# Patient Record
Sex: MALE | Race: WHITE | NOT HISPANIC OR LATINO | Employment: FULL TIME | URBAN - METROPOLITAN AREA
[De-identification: names, ages, dates, MRNs, and addresses within clinical notes are randomized per-mention and may not be internally consistent; named-entity substitution may affect disease eponyms.]

---

## 2018-03-01 ENCOUNTER — OFFICE VISIT (OUTPATIENT)
Dept: FAMILY MEDICINE | Facility: CLINIC | Age: 28
End: 2018-03-01
Payer: COMMERCIAL

## 2018-03-01 ENCOUNTER — TELEPHONE (OUTPATIENT)
Dept: FAMILY MEDICINE | Facility: CLINIC | Age: 28
End: 2018-03-01

## 2018-03-01 VITALS
SYSTOLIC BLOOD PRESSURE: 104 MMHG | TEMPERATURE: 98 F | DIASTOLIC BLOOD PRESSURE: 74 MMHG | WEIGHT: 173.38 LBS | HEART RATE: 84 BPM | BODY MASS INDEX: 24.27 KG/M2 | HEIGHT: 71 IN

## 2018-03-01 DIAGNOSIS — R07.89 CHEST WALL PAIN: Primary | ICD-10-CM

## 2018-03-01 PROCEDURE — 99204 OFFICE O/P NEW MOD 45 MIN: CPT | Mod: S$GLB,,, | Performed by: NURSE PRACTITIONER

## 2018-03-01 PROCEDURE — 99999 PR PBB SHADOW E&M-NEW PATIENT-LVL III: CPT | Mod: PBBFAC,,, | Performed by: NURSE PRACTITIONER

## 2018-03-01 NOTE — PROGRESS NOTES
Subjective:       Patient ID: Freddy Villegas is a 27 y.o. male.    Chief Complaint: Chest Pain (pt is c/o pain in his ribs-left side x3 days when he breathes in deeply. )    HPI     Pt presents to clinic with complaints of sharp pains to his left ribcage upon deep inspiration over the past 3 weeks.   He notes an uncomfortable sensation constantly, but the sharp pain is intermittent.   He first noted the sx to the lower chest wall, but yesterday sx seemed to be near the left breast.   Denies pain with activity, SOB. He is not a smoker. Family Hx - paternal grandfather MI (unsure of age), paternal uncle MI (50s).   He has taken ASA for sx with some relief. Denies any injuries to the area.     Review of Systems   Constitutional: Negative for chills and fever.   HENT: Negative for rhinorrhea, sinus pain, sinus pressure and sneezing.    Eyes: Negative for discharge and itching.   Respiratory: Negative for cough, shortness of breath and wheezing.    Cardiovascular: Negative for chest pain, palpitations and leg swelling.   Gastrointestinal: Negative for blood in stool, diarrhea, nausea and vomiting.   Genitourinary: Negative for difficulty urinating, dysuria, frequency, hematuria and urgency.   Skin: Negative for rash and wound.   Allergic/Immunologic: Negative for environmental allergies and food allergies.   Neurological: Negative for dizziness, light-headedness and numbness.   Psychiatric/Behavioral: Negative for dysphoric mood and sleep disturbance. The patient is not nervous/anxious.        Objective:      Physical Exam   Constitutional: He is oriented to person, place, and time. He appears well-developed and well-nourished.   HENT:   Head: Normocephalic and atraumatic.   Cardiovascular: Normal rate, regular rhythm and normal heart sounds.    No murmur heard.  Pulmonary/Chest: Effort normal and breath sounds normal. No respiratory distress. He has no wheezes. He has no rales. He exhibits tenderness. He exhibits no  crepitus, no edema and no swelling.   Musculoskeletal: Normal range of motion. He exhibits no edema, tenderness or deformity.   Neurological: He is alert and oriented to person, place, and time. No cranial nerve deficit.   Skin: Skin is warm and dry.   Psychiatric: He has a normal mood and affect. His behavior is normal.   Nursing note and vitals reviewed.      Assessment:       1. Chest wall pain        Plan:   Freddy was seen today for chest pain.    Diagnoses and all orders for this visit:    Chest wall pain  -     EKG 12-lead; Future  -     X-Ray Chest PA And Lateral; Future  Sx likely muscular in nature.   RICE therapy, Nsaids.   RTC if sx worsen or fail to improve.

## 2018-03-01 NOTE — TELEPHONE ENCOUNTER
----- Message from Nathalie Farley sent at 3/1/2018  3:59 PM CST -----  Patient states that office was supposed to call in some medication for him today but it never got there. Please remit to:      [facility] [id number] [place] [address] [address] [place] [address] AT [facility]  [address] [place] [id number]  Phone: [phone] Fax: [phone]    Please call when completed at [phone].

## 2018-03-02 RX ORDER — NAPROXEN SODIUM 550 MG/1
550 TABLET ORAL 2 TIMES DAILY WITH MEALS
Qty: 20 TABLET | Refills: 0 | Status: SHIPPED | OUTPATIENT
Start: 2018-03-02 | End: 2018-03-12

## 2018-03-02 NOTE — TELEPHONE ENCOUNTER
Attempted to contact pt to inform him that Naproxen was sent to pharmacy.     No answer; left message that script has been called in.

## 2018-03-06 ENCOUNTER — HOSPITAL ENCOUNTER (OUTPATIENT)
Dept: RADIOLOGY | Facility: HOSPITAL | Age: 28
Discharge: HOME | End: 2018-03-06
Attending: NURSE PRACTITIONER
Payer: COMMERCIAL

## 2018-03-06 ENCOUNTER — CLINICAL SUPPORT (OUTPATIENT)
Dept: CARDIOLOGY | Facility: CLINIC | Age: 28
End: 2018-03-06
Payer: COMMERCIAL

## 2018-03-06 DIAGNOSIS — R07.89 CHEST WALL PAIN: ICD-10-CM

## 2018-03-06 PROCEDURE — 71046 X-RAY EXAM CHEST 2 VIEWS: CPT | Mod: TC,FY,PO

## 2018-03-06 PROCEDURE — 93000 ELECTROCARDIOGRAM COMPLETE: CPT | Mod: S$GLB,,, | Performed by: INTERNAL MEDICINE

## 2018-03-06 PROCEDURE — 71046 X-RAY EXAM CHEST 2 VIEWS: CPT | Mod: 26,,, | Performed by: RADIOLOGY

## 2020-07-10 ENCOUNTER — LAB VISIT (OUTPATIENT)
Dept: PRIMARY CARE CLINIC | Facility: OTHER | Age: 30
End: 2020-07-10
Attending: INTERNAL MEDICINE
Payer: OTHER GOVERNMENT

## 2020-07-10 DIAGNOSIS — Z03.818 ENCOUNTER FOR OBSERVATION FOR SUSPECTED EXPOSURE TO OTHER BIOLOGICAL AGENTS RULED OUT: ICD-10-CM

## 2020-07-10 PROCEDURE — U0003 INFECTIOUS AGENT DETECTION BY NUCLEIC ACID (DNA OR RNA); SEVERE ACUTE RESPIRATORY SYNDROME CORONAVIRUS 2 (SARS-COV-2) (CORONAVIRUS DISEASE [COVID-19]), AMPLIFIED PROBE TECHNIQUE, MAKING USE OF HIGH THROUGHPUT TECHNOLOGIES AS DESCRIBED BY CMS-2020-01-R: HCPCS

## 2020-07-11 LAB — SARS-COV-2 RNA RESP QL NAA+PROBE: NOT DETECTED

## 2021-03-10 ENCOUNTER — IMMUNIZATION (OUTPATIENT)
Dept: FAMILY MEDICINE | Facility: CLINIC | Age: 31
End: 2021-03-10

## 2021-03-10 DIAGNOSIS — Z23 NEED FOR VACCINATION: Primary | ICD-10-CM

## 2021-03-10 PROCEDURE — 91300 COVID-19, MRNA, LNP-S, PF, 30 MCG/0.3 ML DOSE VACCINE: ICD-10-PCS | Mod: ,,, | Performed by: FAMILY MEDICINE

## 2021-03-10 PROCEDURE — 0001A COVID-19, MRNA, LNP-S, PF, 30 MCG/0.3 ML DOSE VACCINE: ICD-10-PCS | Mod: CV19,,, | Performed by: FAMILY MEDICINE

## 2021-03-10 PROCEDURE — 91300 COVID-19, MRNA, LNP-S, PF, 30 MCG/0.3 ML DOSE VACCINE: CPT | Mod: ,,, | Performed by: FAMILY MEDICINE

## 2021-03-10 PROCEDURE — 0001A COVID-19, MRNA, LNP-S, PF, 30 MCG/0.3 ML DOSE VACCINE: CPT | Mod: CV19,,, | Performed by: FAMILY MEDICINE

## 2021-03-31 ENCOUNTER — IMMUNIZATION (OUTPATIENT)
Dept: FAMILY MEDICINE | Facility: CLINIC | Age: 31
End: 2021-03-31

## 2021-03-31 DIAGNOSIS — Z23 NEED FOR VACCINATION: Primary | ICD-10-CM

## 2021-03-31 PROCEDURE — 91300 COVID-19, MRNA, LNP-S, PF, 30 MCG/0.3 ML DOSE VACCINE: ICD-10-PCS | Mod: ,,, | Performed by: INTERNAL MEDICINE

## 2021-03-31 PROCEDURE — 91300 COVID-19, MRNA, LNP-S, PF, 30 MCG/0.3 ML DOSE VACCINE: CPT | Mod: ,,, | Performed by: INTERNAL MEDICINE

## 2021-03-31 PROCEDURE — 0002A COVID-19, MRNA, LNP-S, PF, 30 MCG/0.3 ML DOSE VACCINE: ICD-10-PCS | Mod: CV19,,, | Performed by: INTERNAL MEDICINE

## 2021-03-31 PROCEDURE — 0002A COVID-19, MRNA, LNP-S, PF, 30 MCG/0.3 ML DOSE VACCINE: CPT | Mod: CV19,,, | Performed by: INTERNAL MEDICINE

## 2022-01-08 ENCOUNTER — IMMUNIZATION (OUTPATIENT)
Dept: FAMILY MEDICINE | Facility: CLINIC | Age: 32
End: 2022-01-08
Payer: OTHER GOVERNMENT

## 2022-01-08 DIAGNOSIS — Z23 NEED FOR VACCINATION: Primary | ICD-10-CM

## 2022-01-08 PROCEDURE — 0004A COVID-19, MRNA, LNP-S, PF, 30 MCG/0.3 ML DOSE VACCINE: CPT | Mod: PBBFAC,PO

## 2023-03-18 ENCOUNTER — OFFICE VISIT (OUTPATIENT)
Dept: URGENT CARE | Facility: CLINIC | Age: 33
End: 2023-03-18
Payer: COMMERCIAL

## 2023-03-18 VITALS
OXYGEN SATURATION: 98 % | HEIGHT: 71 IN | DIASTOLIC BLOOD PRESSURE: 70 MMHG | SYSTOLIC BLOOD PRESSURE: 124 MMHG | HEART RATE: 64 BPM | WEIGHT: 173 LBS | TEMPERATURE: 97 F | BODY MASS INDEX: 24.22 KG/M2 | RESPIRATION RATE: 20 BRPM

## 2023-03-18 DIAGNOSIS — N50.819 PAIN IN TESTICLE, UNSPECIFIED LATERALITY: Primary | ICD-10-CM

## 2023-03-18 DIAGNOSIS — N50.819 TESTICLE TENDERNESS: ICD-10-CM

## 2023-03-18 LAB
BILIRUB UR QL STRIP: NEGATIVE
GLUCOSE UR QL STRIP: NEGATIVE
KETONES UR QL STRIP: NEGATIVE
LEUKOCYTE ESTERASE UR QL STRIP: NEGATIVE
PH, POC UA: 6 (ref 5–8)
POC BLOOD, URINE: NEGATIVE
POC NITRATES, URINE: NEGATIVE
PROT UR QL STRIP: NEGATIVE
SP GR UR STRIP: 1.02 (ref 1–1.03)
UROBILINOGEN UR STRIP-ACNC: NORMAL (ref 0.3–2.2)

## 2023-03-18 PROCEDURE — 81003 POCT URINALYSIS, DIPSTICK, AUTOMATED, W/O SCOPE: ICD-10-PCS | Mod: QW,S$GLB,, | Performed by: NURSE PRACTITIONER

## 2023-03-18 PROCEDURE — 81003 URINALYSIS AUTO W/O SCOPE: CPT | Mod: QW,S$GLB,, | Performed by: NURSE PRACTITIONER

## 2023-03-18 PROCEDURE — 99213 PR OFFICE/OUTPT VISIT, EST, LEVL III, 20-29 MIN: ICD-10-PCS | Mod: S$GLB,,, | Performed by: NURSE PRACTITIONER

## 2023-03-18 PROCEDURE — 99213 OFFICE O/P EST LOW 20 MIN: CPT | Mod: S$GLB,,, | Performed by: NURSE PRACTITIONER

## 2023-03-18 NOTE — PROGRESS NOTES
Subjective:       Patient ID: Freddy Villegas is a 32 y.o. male.    Chief Complaint: No chief complaint on file.    HPI  ROS     Objective:      Physical Exam    Assessment:       No diagnosis found.    Plan:                   No follow-ups on file.

## 2023-03-18 NOTE — PATIENT INSTRUCTIONS
Take ibuprofren 600-800 mg three times a day with food for at least 3-5 days.     ER precautions reviewed, US would be best for full evaluation

## 2023-03-18 NOTE — PROGRESS NOTES
Subjective:       Patient ID: Freddy Villegas is a 32 y.o. male.    Chief Complaint: Testicle Pain    Pt states he has been having left testicle pain sx started 3 days ago , a few weeks prior while at the gym 3 weeks ago he was lifting weights and felt his lower back strain, then 3 days later the pain in his testicle started . States pain is sore and strained. Occ sharp pain , denies penile discharge,  no known previous hx, denies any urinary sx . Denies any scrotal edema ,testicle pain is 4/10.       Testicle Pain  The patient's primary symptoms include testicular pain. This is a new problem. The current episode started in the past 7 days. The testicular pain affects the left testicle.     Genitourinary:  Positive for testicular pain.      Objective:      Physical Exam  Vitals and nursing note reviewed. Exam conducted with a chaperone present.   HENT:      Head: Normocephalic.   Eyes:      Pupils: Pupils are equal, round, and reactive to light.   Cardiovascular:      Rate and Rhythm: Normal rate.   Pulmonary:      Effort: Pulmonary effort is normal.   Abdominal:      Hernia: There is no hernia in the left inguinal area or right inguinal area.   Genitourinary:     Penis: Circumcised.       Testes:         Right: Tenderness or swelling not present. Right testis is descended. Cremasteric reflex is present.          Left: Tenderness present. Swelling not present. Left testis is descended. Cremasteric reflex is present.       Epididymis:      Right: No tenderness.      Left: Tenderness present.      Comments: Peyronie's noted,   Left testicle with no significant swelling or bruising, mild ttp   Skin:     General: Skin is warm and dry.   Neurological:      Mental Status: He is alert.   Psychiatric:         Mood and Affect: Mood normal.         Behavior: Behavior normal.         Thought Content: Thought content normal.         Judgment: Judgment normal.       Assessment:       1. Pain in testicle, unspecified laterality     2. Testicle tenderness        Plan:                Patient Instructions   Take ibuprofren 600-800 mg three times a day with food for at least 3-5 days.     ER precautions reviewed, US would be best for full evaluation          No follow-ups on file.      Results for orders placed or performed in visit on 03/18/23   POCT Urinalysis, Dipstick, Automated, W/O Scope   Result Value Ref Range    POC Blood, Urine Negative Negative    POC Bilirubin, Urine Negative Negative    POC Urobilinogen, Urine neg 0.3 - 2.2    POC Ketones, Urine Negative Negative    POC Protein, Urine Negative Negative    POC Nitrates, Urine Negative Negative    POC Glucose, Urine Negative Negative    pH, UA 6.0 5 - 8    POC Specific Gravity, Urine 1.020 1.003 - 1.029    POC Leukocytes, Urine Negative Negative

## 2024-08-02 DIAGNOSIS — M25.561 RIGHT KNEE PAIN, UNSPECIFIED CHRONICITY: Primary | ICD-10-CM

## 2024-08-05 ENCOUNTER — HOSPITAL ENCOUNTER (OUTPATIENT)
Dept: RADIOLOGY | Facility: HOSPITAL | Age: 34
Discharge: HOME | End: 2024-08-05
Attending: ORTHOPAEDIC SURGERY
Payer: COMMERCIAL

## 2024-08-05 ENCOUNTER — OFFICE VISIT (OUTPATIENT)
Dept: ORTHOPEDICS | Facility: CLINIC | Age: 34
End: 2024-08-05
Payer: COMMERCIAL

## 2024-08-05 VITALS — WEIGHT: 173.06 LBS | BODY MASS INDEX: 24.23 KG/M2 | HEIGHT: 71 IN

## 2024-08-05 DIAGNOSIS — M25.562 ACUTE PAIN OF LEFT KNEE: ICD-10-CM

## 2024-08-05 DIAGNOSIS — M25.562 LEFT KNEE PAIN: Primary | ICD-10-CM

## 2024-08-05 DIAGNOSIS — S83.92XA LEFT KNEE SPRAIN: ICD-10-CM

## 2024-08-05 DIAGNOSIS — M25.562 LEFT KNEE PAIN, UNSPECIFIED CHRONICITY: ICD-10-CM

## 2024-08-05 DIAGNOSIS — M25.561 RIGHT KNEE PAIN, UNSPECIFIED CHRONICITY: ICD-10-CM

## 2024-08-05 DIAGNOSIS — S83.512A SPRAIN OF ANTERIOR CRUCIATE LIGAMENT OF LEFT KNEE, INITIAL ENCOUNTER: ICD-10-CM

## 2024-08-05 DIAGNOSIS — S83.92XA LEFT KNEE SPRAIN: Primary | ICD-10-CM

## 2024-08-05 PROCEDURE — 1159F MED LIST DOCD IN RCRD: CPT | Mod: CPTII,S$GLB,, | Performed by: ORTHOPAEDIC SURGERY

## 2024-08-05 PROCEDURE — 97760 ORTHOTIC MGMT&TRAING 1ST ENC: CPT | Mod: S$GLB,,, | Performed by: ORTHOPAEDIC SURGERY

## 2024-08-05 PROCEDURE — 73562 X-RAY EXAM OF KNEE 3: CPT | Mod: 26,LT,, | Performed by: RADIOLOGY

## 2024-08-05 PROCEDURE — 99204 OFFICE O/P NEW MOD 45 MIN: CPT | Mod: 25,S$GLB,, | Performed by: ORTHOPAEDIC SURGERY

## 2024-08-05 PROCEDURE — 3008F BODY MASS INDEX DOCD: CPT | Mod: CPTII,S$GLB,, | Performed by: ORTHOPAEDIC SURGERY

## 2024-08-05 PROCEDURE — 99999 PR PBB SHADOW E&M-EST. PATIENT-LVL II: CPT | Mod: PBBFAC,,, | Performed by: ORTHOPAEDIC SURGERY

## 2024-08-05 PROCEDURE — 73560 X-RAY EXAM OF KNEE 1 OR 2: CPT | Mod: 26,59,RT, | Performed by: RADIOLOGY

## 2024-08-05 PROCEDURE — 73560 X-RAY EXAM OF KNEE 1 OR 2: CPT | Mod: TC,PO,RT

## 2024-08-12 ENCOUNTER — OFFICE VISIT (OUTPATIENT)
Dept: ORTHOPEDICS | Facility: CLINIC | Age: 34
End: 2024-08-12
Payer: COMMERCIAL

## 2024-08-12 VITALS — WEIGHT: 185 LBS | HEIGHT: 71 IN | BODY MASS INDEX: 25.9 KG/M2

## 2024-08-12 DIAGNOSIS — M25.562 LEFT KNEE PAIN, UNSPECIFIED CHRONICITY: Primary | ICD-10-CM

## 2024-08-12 DIAGNOSIS — S83.512A SPRAIN OF ANTERIOR CRUCIATE LIGAMENT OF LEFT KNEE, INITIAL ENCOUNTER: ICD-10-CM

## 2024-08-12 PROCEDURE — 99214 OFFICE O/P EST MOD 30 MIN: CPT | Mod: S$GLB,,, | Performed by: ORTHOPAEDIC SURGERY

## 2024-08-12 PROCEDURE — 99999 PR PBB SHADOW E&M-EST. PATIENT-LVL II: CPT | Mod: PBBFAC,,, | Performed by: ORTHOPAEDIC SURGERY

## 2024-08-12 PROCEDURE — 3008F BODY MASS INDEX DOCD: CPT | Mod: CPTII,S$GLB,, | Performed by: ORTHOPAEDIC SURGERY

## 2024-08-12 NOTE — PROGRESS NOTES
33 years old follow-up of his left knee sprain presumed ACL tear.  MRI confirmed our suspicion of ACL tear    Assessment: Left knee ACL tear     Plan:  We had discussion about operative versus nonoperative care.  Patient interested in nonoperative care at this point.  We will get him a new knee brace, we will get him set up with therapy, we will check him back in about a month, and if at any time he becomes interested in ACL reconstruction will be happy to offer him that leaning towards an allograft

## 2024-08-21 NOTE — PROGRESS NOTES
[facility]   Physical Therapy Initial Evaluation      Name: Freddy Villegas  [facility] Number: [id number]    Therapy Diagnosis:   Encounter Diagnoses   Name Primary?    Sprain of anterior cruciate ligament of left knee, initial encounter     Difficulty walking Yes     Physician: Gaetano Polk MD    Physician Orders: PT Eval and Treat   Medical Diagnosis from Referral: S83.512A (ICD-10-CM) - Sprain of anterior cruciate ligament of left knee  Evaluation Date: 8/27/2024  Authorization Period Expiration: 8/12/2025  Plan of Care Expiration: 10/27/2024  Visit # / Visits authorized: 1/ 1  #Visits based on PHYSICAL THERAPY POC: 12     Foto  Date  Score      Knee   Lower Score = Greater Disability   #1/3 8/27/2024 70.7   #2/3     #3/3       Time in  1:13pm   Time out 1:55pm   Total Appointment Time (timed & untimed codes) 42 minutes      Precautions: Standard    Subjective     Date of injury: about 3 weeks ago  History of current condition - patient was playing basketball, landed on the ground and felt his L knee buckle resulting in falling to the ground. He then found out her tore his L ACL.      Prior medical treatment: L knee brace provided for patient by Dr. Polk     History of falls/MVAs: none     Current functional status  Severe limitation    Previous functional status  No limitation    Patient stated goal Return to previous level of function      Imaging:  L X-ray FINDINGS: No acute fracture or dislocation.  Slight bilateral lateral patellar tilt and patellofemoral joint space narrowing.  Small left joint effusion.    L knee MRI Impression:  1. There appears to be evidence of intermediate grade ACL strain.  2. Subtle intermediate signal in the proximal PCL suggestive of low-grade strain is seen.  3. There is intermediate increased T2 signal in the anterior horn root ligament of the lateral meniscus suggestive of low to intermediate grade strain.  Otherwise, the lateral meniscus  "appears intact.  4. Minimal edema like signal along the posterior distal fibers of the vastus medialis and vastus lateralis muscles may reflect low-grade strains.  5. There is attenuation of the MPFL with subtle increased adjacent T2 signal along its deep fibers near the medial epicondyle insertion which may reflect minimal partial thickness deep fiber tearing.  6. Medial head gastrocnemius tendinopathy is seen with adjacent ganglia.  7. Baker's cyst.  8. Additional findings and details as above.    Patient discussed MRI with Dr. Polk, who indicated a Gr III ACL tear at L knee.     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      Pain:      Current 0/10       Worst 10/10       Best  0/10     Location L medial knee    Description  Sudden sharp and shooting pain     Aggravating factors Pivoting wrong    Easing factors  Brace      Medical History:   No past medical history on file.    Surgical History:   Freddy Villegas  has a past surgical history that includes Clavicle surgery.    Medications:   Freddy currently has no medications in their medication list.    Allergies:   Review of patient's allergies indicates:  No Known Allergies     Objective    8/27/2024:  Observation/posture: sits with L LE extended, L gastroc atrophy     Gait: WNL    Range of Motion:   Knee Right  Left    Flexion 145 142   Extension +2 0     Lower Extremity Strength       Knee           Right          Left     Extension: 5/5 4-/5   Flexion: 4+/5 4+/5   Hip  Right  Left    Abduction  4+/5 4+/5     Function/balance:    Right  Left    Single leg stance  - 30", 30", 30" 20", 13", 30" L>R pes planus in SLS    Squat tba  - -     Joint Mobility:    Left    PFJ  Impaired    Knee joint  Impaired      Edema:    Joint line in cm 10 cm distal to tibial tuberosity in cm 5 cm above in cm   Right 38.0 36.5 40.0   Left  37.5 36.0 38.5      Treatment   Total Treatment time (time-based codes) separate " "from Evaluation: 28 minutes    + indicates new exercise   Bold indicates completed exercise    therapeutic exercises to develop strength, endurance, ROM, flexibility, posture, and core stabilization for 18 minutes:  Patient education on nature of current condition and PHYSICAL THERAPY POC  Written HOME EXERCISE PROGRAM provided, reviewed, patient demo understanding   L SLR, 3x10   L ankle DF/PF, blue band, 3x10     manual therapy techniques: Joint mobilizations, Manual traction, Myofacial release, Soft tissue Mobilization, and Friction Massage for 4 minutes:  L PFJ, knee joint Gr III/II JM     neuromuscular re-education activities to improve: Balance, Coordination, Kinesthetic, Sense, Proprioception, and Posture for 6 minutes:  L quad sets, 5" hold, 3x10    therapeutic activities to improve functional performance for 00 minutes:      gait training to improve functional mobility and safety for 00 minutes:      Home Exercises and Patient Education Provided  Education provided:   - yes    Home Exercises Provided: yes.  Exercises were reviewed and Freddy was able to demonstrate them prior to the end of the session.  Freddy demonstrated good  understanding of the education provided.     Assessment     Freddy is a 33 y.o. referred to outpatient Physical Therapy with a medical diagnosis of S83.512A (ICD-10-CM) - Sprain of anterior cruciate ligament of left knee.     Pt presents with decreased ROM, muscle strength, flexibility, joint mobility. Increased pain, stiffness, soft tissue restriction. Impaired posture, joint mechanics, balance, gait pattern.     The patient's current task deficits include the following: limitation in ADL, self care, social/recreational tasks.     Patient prognosis: good  Rehab potential: good    Patient will benefit from skilled outpatient Physical Therapy to address the deficits stated above and in the chart below, provide patient /family education, to maximize patient's level of independence, " "and to address functional deficits.    Plan of care discussed with patient: Yes  Patient's spiritual, cultural and educational needs considered and patient is agreeable to the plan of care and goals as stated below:     Anticipated Barriers for therapy:  none    Medical Necessity is demonstrated by the following  History  Co-morbidities and personal factors that may impact the plan of care [] LOW: no personal factors / co-morbidities  [x] MODERATE: 1-2 personal factors / co-morbidities  [] HIGH: 3+ personal factors / co-morbidities    Moderate / High Support Documentation:   Co-morbidities affecting plan of care: None listed     Personal Factors:   coping style  social background  lifestyle     Examination  Body Structures and Functions, activity limitations and participation restrictions that may impact the plan of care [] LOW: addressing 1-2 elements  [x] MODERATE: 3+ elements  [] HIGH: 4+ elements    Clinical Presentation [] LOW: stable  [x] MODERATE: Evolving  [] HIGH: Unstable     Decision Making/ Complexity Score: moderate       Goals:   Short Terms Goals: 3 weeks    Goal  Progress  Date    (1)  Patient will be I with HOME EXERCISE PROGRAM  Initial, Not Met 8/27/2024     (2)  Patient will obtain +2 deg L knee extension ACTIVE RANGE OF MOTION to indicate improved standing tolerance, > 60 minutes   Initial, Not Met  8/27/2024     (3)   Patient will obtain 30" L SLS average of 3 trials to indicate improved safety in negotiating obstacles in pathway   Initial, Not Met  8/27/2024       Long Term Goals: 6 weeks    Goal  Progress  Date    (1)  Patient will obtain 5/5 L knee ext MMT to indicate improved ability to make changes in direction while walking as required for work in coaching  Initial, Not Met  8/27/2024   (2)   Patient will pivot to make changes in direction with no episodes of instability at L knee to facilitate return to play with his children   Initial, Not Met 8/27/2024    (3)   Patient will negotiate 2 " flights of stairs using alternating gait pattern, no HRA, to facilitate return to previous level of function   Initial, Not Met  8/27/2024       Plan     Plan of care Certification: 8/27/2024 to 10/27/2024.    Outpatient Physical Therapy 2 times weekly for 6 weeks to include the following interventions: Cervical/Lumbar Traction, Electrical Stimulation re-eval, dry needling, Gait Training, Iontophoresis (with ), Manual Therapy, Moist Heat/ Ice, Neuromuscular Re-ed, Patient Education, Self Care, Therapeutic Activities, and Therapeutic Exercise.     Physical therapist and physical therapy assistant(s) will met face to face to discuss patient's treatment plan and progress towards established goals. Pt will be seen by a physical therapist minimally every 6th visit or every 30 days.    Stacie Diaz, PT  8/27/2024       I CERTIFY THE NEED FOR THESE SERVICES FURNISHED UNDER THIS PLAN OF TREATMENT AND WHILE UNDER MY CARE     Physician's comments:           Physician's Signature: ___________________________________________________

## 2024-08-27 ENCOUNTER — CLINICAL SUPPORT (OUTPATIENT)
Dept: REHABILITATION | Facility: HOSPITAL | Age: 34
End: 2024-08-27
Attending: ORTHOPAEDIC SURGERY

## 2024-08-27 DIAGNOSIS — R26.2 DIFFICULTY WALKING: Primary | ICD-10-CM

## 2024-08-27 DIAGNOSIS — S83.512A SPRAIN OF ANTERIOR CRUCIATE LIGAMENT OF LEFT KNEE, INITIAL ENCOUNTER: ICD-10-CM

## 2024-08-27 PROCEDURE — 97110 THERAPEUTIC EXERCISES: CPT | Mod: PN

## 2024-08-27 PROCEDURE — 97162 PT EVAL MOD COMPLEX 30 MIN: CPT | Mod: PN

## 2024-08-29 NOTE — PLAN OF CARE
[facility]   Physical Therapy Initial Evaluation      Name: Freddy Villegas  [facility] Number: [id number]    Therapy Diagnosis:   Encounter Diagnoses   Name Primary?    Sprain of anterior cruciate ligament of left knee, initial encounter     Difficulty walking Yes     Physician: Gaetano Polk MD    Physician Orders: PT Eval and Treat   Medical Diagnosis from Referral: S83.512A (ICD-10-CM) - Sprain of anterior cruciate ligament of left knee  Evaluation Date: 8/27/2024  Authorization Period Expiration: 8/12/2025  Plan of Care Expiration: 10/27/2024  Visit # / Visits authorized: 1/ 1  #Visits based on PHYSICAL THERAPY POC: 12     Foto  Date  Score      Knee   Lower Score = Greater Disability   #1/3 8/27/2024 70.7   #2/3     #3/3       Time in  1:13pm   Time out 1:55pm   Total Appointment Time (timed & untimed codes) 42 minutes      Precautions: Standard    Subjective     Date of injury: about 3 weeks ago  History of current condition - patient was playing basketball, landed on the ground and felt his L knee buckle resulting in falling to the ground. He then found out her tore his L ACL.      Prior medical treatment: L knee brace provided for patient by Dr. Polk     History of falls/MVAs: none     Current functional status  Severe limitation    Previous functional status  No limitation    Patient stated goal Return to previous level of function      Imaging:  L X-ray FINDINGS: No acute fracture or dislocation.  Slight bilateral lateral patellar tilt and patellofemoral joint space narrowing.  Small left joint effusion.    L knee MRI Impression:  1. There appears to be evidence of intermediate grade ACL strain.  2. Subtle intermediate signal in the proximal PCL suggestive of low-grade strain is seen.  3. There is intermediate increased T2 signal in the anterior horn root ligament of the lateral meniscus suggestive of low to intermediate grade strain.  Otherwise, the lateral meniscus  "appears intact.  4. Minimal edema like signal along the posterior distal fibers of the vastus medialis and vastus lateralis muscles may reflect low-grade strains.  5. There is attenuation of the MPFL with subtle increased adjacent T2 signal along its deep fibers near the medial epicondyle insertion which may reflect minimal partial thickness deep fiber tearing.  6. Medial head gastrocnemius tendinopathy is seen with adjacent ganglia.  7. Baker's cyst.  8. Additional findings and details as above.    Patient discussed MRI with Dr. Polk, who indicated a Gr III ACL tear at L knee.     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      Pain:      Current 0/10       Worst 10/10       Best  0/10     Location L medial knee    Description  Sudden sharp and shooting pain     Aggravating factors Pivoting wrong    Easing factors  Brace      Medical History:   No past medical history on file.    Surgical History:   Freddy Villegas  has a past surgical history that includes Clavicle surgery.    Medications:   Freddy currently has no medications in their medication list.    Allergies:   Review of patient's allergies indicates:  No Known Allergies     Objective    8/27/2024:  Observation/posture: sits with L LE extended, L gastroc atrophy     Gait: WNL    Range of Motion:   Knee Right  Left    Flexion 145 142   Extension +2 0     Lower Extremity Strength       Knee           Right          Left     Extension: 5/5 4-/5   Flexion: 4+/5 4+/5   Hip  Right  Left    Abduction  4+/5 4+/5     Function/balance:    Right  Left    Single leg stance  - 30", 30", 30" 20", 13", 30" L>R pes planus in SLS    Squat tba  - -     Joint Mobility:    Left    PFJ  Impaired    Knee joint  Impaired      Edema:    Joint line in cm 10 cm distal to tibial tuberosity in cm 5 cm above in cm   Right 38.0 36.5 40.0   Left  37.5 36.0 38.5      Treatment   Total Treatment time (time-based codes) separate " "from Evaluation: 28 minutes    + indicates new exercise   Bold indicates completed exercise    therapeutic exercises to develop strength, endurance, ROM, flexibility, posture, and core stabilization for 18 minutes:  Patient education on nature of current condition and PHYSICAL THERAPY POC  Written HOME EXERCISE PROGRAM provided, reviewed, patient demo understanding   L SLR, 3x10   L ankle DF/PF, blue band, 3x10     manual therapy techniques: Joint mobilizations, Manual traction, Myofacial release, Soft tissue Mobilization, and Friction Massage for 4 minutes:  L PFJ, knee joint Gr III/II JM     neuromuscular re-education activities to improve: Balance, Coordination, Kinesthetic, Sense, Proprioception, and Posture for 6 minutes:  L quad sets, 5" hold, 3x10    therapeutic activities to improve functional performance for 00 minutes:      gait training to improve functional mobility and safety for 00 minutes:      Home Exercises and Patient Education Provided  Education provided:   - yes    Home Exercises Provided: yes.  Exercises were reviewed and Freddy was able to demonstrate them prior to the end of the session.  Freddy demonstrated good  understanding of the education provided.     Assessment     Freddy is a 33 y.o. referred to outpatient Physical Therapy with a medical diagnosis of S83.512A (ICD-10-CM) - Sprain of anterior cruciate ligament of left knee.     Pt presents with decreased ROM, muscle strength, flexibility, joint mobility. Increased pain, stiffness, soft tissue restriction. Impaired posture, joint mechanics, balance, gait pattern.     The patient's current task deficits include the following: limitation in ADL, self care, social/recreational tasks.     Patient prognosis: good  Rehab potential: good    Patient will benefit from skilled outpatient Physical Therapy to address the deficits stated above and in the chart below, provide patient /family education, to maximize patient's level of independence, " "and to address functional deficits.    Plan of care discussed with patient: Yes  Patient's spiritual, cultural and educational needs considered and patient is agreeable to the plan of care and goals as stated below:     Anticipated Barriers for therapy: none    Medical Necessity is demonstrated by the following  History  Co-morbidities and personal factors that may impact the plan of care [] LOW: no personal factors / co-morbidities  [x] MODERATE: 1-2 personal factors / co-morbidities  [] HIGH: 3+ personal factors / co-morbidities    Moderate / High Support Documentation:   Co-morbidities affecting plan of care: None listed     Personal Factors:   coping style  social background  lifestyle     Examination  Body Structures and Functions, activity limitations and participation restrictions that may impact the plan of care [] LOW: addressing 1-2 elements  [x] MODERATE: 3+ elements  [] HIGH: 4+ elements    Clinical Presentation [] LOW: stable  [x] MODERATE: Evolving  [] HIGH: Unstable     Decision Making/ Complexity Score: moderate       Goals:   Short Terms Goals: 3 weeks    Goal  Progress  Date    (1)  Patient will be I with HOME EXERCISE PROGRAM  Initial, Not Met 8/27/2024     (2)  Patient will obtain +2 deg L knee extension ACTIVE RANGE OF MOTION to indicate improved standing tolerance, > 60 minutes   Initial, Not Met  8/27/2024     (3)   Patient will obtain 30" L SLS average of 3 trials to indicate improved safety in negotiating obstacles in pathway   Initial, Not Met  8/27/2024       Long Term Goals: 6 weeks    Goal  Progress  Date    (1)  Patient will obtain 5/5 L knee ext MMT to indicate improved ability to make changes in direction while walking as required for work in coaching  Initial, Not Met  8/27/2024   (2)   Patient will pivot to make changes in direction with no episodes of instability at L knee to facilitate return to play with his children   Initial, Not Met 8/27/2024    (3)   Patient will negotiate 2 " flights of stairs using alternating gait pattern, no HRA, to facilitate return to previous level of function   Initial, Not Met  8/27/2024       Plan     Plan of care Certification: 8/27/2024 to 10/27/2024.    Outpatient Physical Therapy 2 times weekly for 6 weeks to include the following interventions: Cervical/Lumbar Traction, Electrical Stimulation re-eval, dry needling, Gait Training, Iontophoresis (with ), Manual Therapy, Moist Heat/ Ice, Neuromuscular Re-ed, Patient Education, Self Care, Therapeutic Activities, and Therapeutic Exercise.     Physical therapist and physical therapy assistant(s) will met face to face to discuss patient's treatment plan and progress towards established goals. Pt will be seen by a physical therapist minimally every 6th visit or every 30 days.    Stacie Diaz, PT  8/27/2024       I CERTIFY THE NEED FOR THESE SERVICES FURNISHED UNDER THIS PLAN OF TREATMENT AND WHILE UNDER MY CARE     Physician's comments:           Physician's Signature: ___________________________________________________

## 2024-09-10 ENCOUNTER — CLINICAL SUPPORT (OUTPATIENT)
Dept: REHABILITATION | Facility: HOSPITAL | Age: 34
End: 2024-09-10
Payer: COMMERCIAL

## 2024-09-10 DIAGNOSIS — S83.512D SPRAIN OF ANTERIOR CRUCIATE LIGAMENT OF LEFT KNEE, SUBSEQUENT ENCOUNTER: Primary | ICD-10-CM

## 2024-09-10 DIAGNOSIS — R26.2 DIFFICULTY WALKING: ICD-10-CM

## 2024-09-10 PROCEDURE — 97110 THERAPEUTIC EXERCISES: CPT | Mod: PN

## 2024-09-10 PROCEDURE — 97112 NEUROMUSCULAR REEDUCATION: CPT | Mod: PN

## 2024-09-10 NOTE — PROGRESS NOTES
"[facility] OUTPATIENT THERAPY AND WELLNESS   Physical Therapy Treatment Note      Name: Freddy Villegas  [facility] Number: [id number]    Therapy Diagnosis:   Encounter Diagnoses   Name Primary?    Sprain of anterior cruciate ligament of left knee, subsequent encounter Yes    Difficulty walking      Physician: Gaetano Polk MD    Physician Orders: PT Eval and Treat   Medical Diagnosis from Referral: S83.512A (ICD-10-CM) - Sprain of anterior cruciate ligament of left knee  Evaluation Date: 9/10/2024  Authorization Period Expiration: 8/12/2025  Plan of Care Expiration: 10/27/2024  Visit # / Visits authorized: 2/ 12  #Visits based on PHYSICAL THERAPY POC: 12     Foto  Date  Score      Knee   Lower Score = Greater Disability   #1/3 8/27/2024 70.7   #2/3     #3/3       Time in  1:49pm   Time out 2:35pm   Total Appointment Time  46 minutes      Precautions: Standard  Patient discussed MRI with Dr. Polk, who indicated a Gr III ACL tear at L knee.     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      Pain:      Current 0/10     Location L medial knee      Objective    8/27/2024:  Observation/posture: sits with L LE extended, L gastroc atrophy     Gait: WNL    Range of Motion:   Knee Right  Left    Flexion 145 142   Extension +2 0     Lower Extremity Strength       Knee           Right          Left     Extension: 5/5 4-/5   Flexion: 4+/5 4+/5   Hip  Right  Left    Abduction  4+/5 4+/5     Function/balance:    Right  Left    Single leg stance  - 30", 30", 30" 20", 13", 30" L>R pes planus in SLS    Squat tba  - -     Joint Mobility:    Left    PFJ  Impaired    Knee joint  Impaired  " "    Edema:    Joint line in cm 10 cm distal to tibial tuberosity in cm 5 cm above in cm   Right 38.0 36.5 40.0   Left  37.5 36.0 38.5      Treatment   + indicates new exercise   Bold indicates completed exercise    therapeutic exercises to develop strength, endurance, ROM, flexibility, posture, and core stabilization for 23 minutes:  L SLR, flex #1, abd #1, ext #1, add, 3x10   L ankle DF/PF, blue band, 3x10   +L arch doming, airex mat, 3" hold, 3x10    manual therapy techniques: Joint mobilizations, Manual traction, Myofacial release, Soft tissue Mobilization, and Friction Massage for 5 minutes:  L PFJ, knee joint Gr III/II JM     neuromuscular re-education activities to improve: Balance, Coordination, Kinesthetic, Sense, Proprioception, and Posture for 10 minutes:  L quad sets, 5" hold, 3x10  +L SLS, airex mat, 10"x10     therapeutic activities to improve functional performance for 8 minutes:  +Leg press, #50, x18  +Leg press heel raise, #50, 3x10    gait training to improve functional mobility and safety for 00 minutes:      Home Exercises and Patient Education Provided  Education provided:   - yes    Home Exercises Provided: yes.  Exercises were reviewed and Freddy was able to demonstrate them prior to the end of the session.  Freddy demonstrated good  understanding of the education provided.     Assessment   Patient requires training in arch doming to minimize sheering force at L medial knee during SLS as dynamic pes planus impacts stability at L LE. Sufficient quads activation for leg press squats to 18 repetitions before localized fatigue and inhibition impacts localized musculature function.     Pt presents with decreased ROM, muscle strength, flexibility, joint mobility. Increased pain, stiffness, soft tissue restriction. Impaired posture, joint mechanics, balance, gait pattern.     The patient's current task deficits include the following: limitation in ADL, self care, social/recreational tasks. " "    Patient prognosis: good  Rehab potential: good    Patient will benefit from skilled outpatient Physical Therapy to address the deficits stated above and in the chart below, provide patient /family education, to maximize patient's level of independence, and to address functional deficits.    Anticipated Barriers for therapy:  none    Goals:   Short Terms Goals: 3 weeks    Goal  Progress  Date    (1)  Patient will be I with HOME EXERCISE PROGRAM  Initial, Not Met 9/10/2024     (2)  Patient will obtain +2 deg L knee extension ACTIVE RANGE OF MOTION to indicate improved standing tolerance, > 60 minutes   Initial, Not Met  9/10/2024     (3)   Patient will obtain 30" L SLS average of 3 trials to indicate improved safety in negotiating obstacles in pathway   Initial, Not Met  9/10/2024       Long Term Goals: 6 weeks    Goal  Progress  Date    (1)  Patient will obtain 5/5 L knee ext MMT to indicate improved ability to make changes in direction while walking as required for work in coaching  Initial, Not Met  9/10/2024   (2)   Patient will pivot to make changes in direction with no episodes of instability at L knee to facilitate return to play with his children   Initial, Not Met 9/10/2024    (3)   Patient will negotiate 2 flights of stairs using alternating gait pattern, no HRA, to facilitate return to previous level of function   Initial, Not Met  9/10/2024       Plan     Plan of care Certification: 9/10/2024 to 10/27/2024.    Outpatient Physical Therapy 2 times weekly for 6 weeks to include the following interventions: Cervical/Lumbar Traction, Electrical Stimulation re-eval, dry needling, Gait Training, Iontophoresis (with ), Manual Therapy, Moist Heat/ Ice, Neuromuscular Re-ed, Patient Education, Self Care, Therapeutic Activities, and Therapeutic Exercise.     Physical therapist and physical therapy assistant(s) will met face to face to discuss patient's treatment plan and progress towards established goals. Pt will " be seen by a physical therapist minimally every 6th visit or every 30 days.    Stacie Diaz, PT  9/11/2024

## 2024-09-12 ENCOUNTER — CLINICAL SUPPORT (OUTPATIENT)
Dept: REHABILITATION | Facility: HOSPITAL | Age: 34
End: 2024-09-12
Payer: COMMERCIAL

## 2024-09-12 DIAGNOSIS — S83.512D SPRAIN OF ANTERIOR CRUCIATE LIGAMENT OF LEFT KNEE, SUBSEQUENT ENCOUNTER: Primary | ICD-10-CM

## 2024-09-12 DIAGNOSIS — R26.2 DIFFICULTY WALKING: ICD-10-CM

## 2024-09-12 PROCEDURE — 97112 NEUROMUSCULAR REEDUCATION: CPT | Mod: PN

## 2024-09-12 PROCEDURE — 97110 THERAPEUTIC EXERCISES: CPT | Mod: PN

## 2024-09-12 PROCEDURE — 97530 THERAPEUTIC ACTIVITIES: CPT | Mod: PN

## 2024-09-12 NOTE — PROGRESS NOTES
"[facility]AP[facility]   Physical Therapy Treatment Note      Name: Freddy Villegas  [facility] Number: [id number]    Therapy Diagnosis:   Encounter Diagnoses   Name Primary?    Sprain of anterior cruciate ligament of left knee, subsequent encounter Yes    Difficulty walking      Physician: Gaetano Polk MD    Physician Orders: PT Eval and Treat   Medical Diagnosis from Referral: S83.512A (ICD-10-CM) - Sprain of anterior cruciate ligament of left knee  Evaluation Date: 9/12/2024  Authorization Period Expiration: 8/12/2025  Plan of Care Expiration: 10/27/2024  Visit # / Visits authorized: 3/ 12  #Visits based on PHYSICAL THERAPY POC: 12     Foto  Date  Score      Knee   Lower Score = Greater Disability   #1/3 8/27/2024 70.7   #2/3     #3/3       Time in  1:14pm   Time out 2:02pm   Total Appointment Time  48 minutes      Precautions: Standard  Patient discussed MRI with Dr. Polk, who indicated a Gr III ACL tear at L knee.     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      Pain:      Current 0/10     Location L medial knee      Objective    8/27/2024:  Observation/posture: sits with L LE extended, L gastroc atrophy     Gait: WNL    Range of Motion:   Knee Right  Left    Flexion 145 142   Extension +2 0     Lower Extremity Strength       Knee           Right          Left     Extension: 5/5 4-/5   Flexion: 4+/5 4+/5   Hip  Right  Left    Abduction  4+/5 4+/5     Function/balance:    Right  Left    Single leg stance  - 30", 30", 30" 20", 13", 30" L>R pes planus in SLS    Squat tba  - -     Joint Mobility:    Left    PFJ  Impaired    Knee joint  Impaired  " "    Edema:    Joint line in cm 10 cm distal to tibial tuberosity in cm 5 cm above in cm   Right 38.0 36.5 40.0   Left  37.5 36.0 38.5      Treatment   + indicates new exercise   Bold indicates completed exercise    therapeutic exercises to develop strength, endurance, ROM, flexibility, posture, and core stabilization for 24 minutes:  L SLR, flex #2, abd #2, ext #2, add #1, 3x10   L ankle DF/PF, blue band, 3x10   L arch doming, airex mat, 3" hold, 3x10  +L TKE, green band, 3" hold, 3x10    manual therapy techniques: Joint mobilizations, Manual traction, Myofacial release, Soft tissue Mobilization, and Friction Massage for 6 minutes:  L PFJ, knee joint Gr III/II JM     neuromuscular re-education activities to improve: Balance, Coordination, Kinesthetic, Sense, Proprioception, and Posture for 10 minutes:  L quad sets, 5" hold, 3x10  L SLS, airex mat, +12"x8     therapeutic activities to improve functional performance for 8 minutes:  Leg press, #50, 2x10  Leg press heel raise, #50, 3x10    gait training to improve functional mobility and safety for 00 minutes:      Home Exercises and Patient Education Provided  Education provided:   - yes    Home Exercises Provided: yes.  Exercises were reviewed and Freddy was able to demonstrate them prior to the end of the session.  Freddy demonstrated good  understanding of the education provided.     Assessment   Continued with emphasis on LE strengthening in open and closed chain positioning. L TKE against resistance band implemented for quads activation during WB tasks for improved motor control during weight acceptance; appropriate level of challenge.     Pt presents with decreased ROM, muscle strength, flexibility, joint mobility. Increased pain, stiffness, soft tissue restriction. Impaired posture, joint mechanics, balance, gait pattern.     The patient's current task deficits include the following: limitation in ADL, self care, social/recreational tasks.     Patient " "prognosis: good  Rehab potential: good    Patient will benefit from skilled outpatient Physical Therapy to address the deficits stated above and in the chart below, provide patient /family education, to maximize patient's level of independence, and to address functional deficits.    Anticipated Barriers for therapy:  none    Goals:   Short Terms Goals: 3 weeks    Goal  Progress  Date    (1)  Patient will be I with HOME EXERCISE PROGRAM  Initial, Not Met 9/12/2024     (2)  Patient will obtain +2 deg L knee extension ACTIVE RANGE OF MOTION to indicate improved standing tolerance, > 60 minutes   Initial, Not Met  9/12/2024     (3)   Patient will obtain 30" L SLS average of 3 trials to indicate improved safety in negotiating obstacles in pathway   Initial, Not Met  9/12/2024       Long Term Goals: 6 weeks    Goal  Progress  Date    (1)  Patient will obtain 5/5 L knee ext MMT to indicate improved ability to make changes in direction while walking as required for work in coaching  Initial, Not Met  9/12/2024   (2)   Patient will pivot to make changes in direction with no episodes of instability at L knee to facilitate return to play with his children   Initial, Not Met 9/12/2024    (3)   Patient will negotiate 2 flights of stairs using alternating gait pattern, no HRA, to facilitate return to previous level of function   Initial, Not Met  9/12/2024       Plan     Plan of care Certification: 9/12/2024 to 10/27/2024.    Outpatient Physical Therapy 2 times weekly for 6 weeks to include the following interventions: Cervical/Lumbar Traction, Electrical Stimulation re-eval, dry needling, Gait Training, Iontophoresis (with ), Manual Therapy, Moist Heat/ Ice, Neuromuscular Re-ed, Patient Education, Self Care, Therapeutic Activities, and Therapeutic Exercise.     Physical therapist and physical therapy assistant(s) will met face to face to discuss patient's treatment plan and progress towards established goals. Pt will be seen by " a physical therapist minimally every 6th visit or every 30 days.    Stacie Diaz, PT  9/12/2024

## 2024-09-13 ENCOUNTER — OFFICE VISIT (OUTPATIENT)
Dept: ORTHOPEDICS | Facility: CLINIC | Age: 34
End: 2024-09-13
Payer: COMMERCIAL

## 2024-09-13 DIAGNOSIS — S83.512A SPRAIN OF ANTERIOR CRUCIATE LIGAMENT OF LEFT KNEE, INITIAL ENCOUNTER: Primary | ICD-10-CM

## 2024-09-13 PROCEDURE — 99999 PR PBB SHADOW E&M-EST. PATIENT-LVL II: CPT | Mod: PBBFAC,,, | Performed by: ORTHOPAEDIC SURGERY

## 2024-09-13 NOTE — PROGRESS NOTES
34 years old follow up ACL tear.  Is feeling better able to get around pretty well with his brace has had a couple episodes of instability.      At this point wants to continue with nonoperative care, continue with therapy, continue with bracing, if he becomes interested in ACL reconstruction with allograft we will be happy to offer that emanate time

## 2024-09-17 ENCOUNTER — CLINICAL SUPPORT (OUTPATIENT)
Dept: REHABILITATION | Facility: HOSPITAL | Age: 34
End: 2024-09-17
Payer: COMMERCIAL

## 2024-09-17 DIAGNOSIS — S83.512D SPRAIN OF ANTERIOR CRUCIATE LIGAMENT OF LEFT KNEE, SUBSEQUENT ENCOUNTER: Primary | ICD-10-CM

## 2024-09-17 DIAGNOSIS — R26.2 DIFFICULTY WALKING: ICD-10-CM

## 2024-09-17 PROCEDURE — 97112 NEUROMUSCULAR REEDUCATION: CPT | Mod: PN

## 2024-09-17 PROCEDURE — 97110 THERAPEUTIC EXERCISES: CPT | Mod: PN

## 2024-09-17 PROCEDURE — 97530 THERAPEUTIC ACTIVITIES: CPT | Mod: PN

## 2024-09-24 ENCOUNTER — CLINICAL SUPPORT (OUTPATIENT)
Dept: REHABILITATION | Facility: HOSPITAL | Age: 34
End: 2024-09-24
Payer: COMMERCIAL

## 2024-09-24 DIAGNOSIS — R26.2 DIFFICULTY WALKING: Primary | ICD-10-CM

## 2024-09-24 DIAGNOSIS — S83.512D SPRAIN OF ANTERIOR CRUCIATE LIGAMENT OF LEFT KNEE, SUBSEQUENT ENCOUNTER: ICD-10-CM

## 2024-09-24 PROCEDURE — 97530 THERAPEUTIC ACTIVITIES: CPT | Mod: PN,CQ

## 2024-09-24 PROCEDURE — 97112 NEUROMUSCULAR REEDUCATION: CPT | Mod: PN,CQ

## 2024-09-24 PROCEDURE — 97110 THERAPEUTIC EXERCISES: CPT | Mod: PN,CQ

## 2024-09-24 NOTE — PROGRESS NOTES
"[facility]   Physical Therapy Treatment Note      Name: Freddy Villegas  [facility] Number: [id number]    Therapy Diagnosis:   Encounter Diagnoses   Name Primary?    Difficulty walking Yes    Sprain of anterior cruciate ligament of left knee, subsequent encounter      Physician: Gaetano Polk MD    Physician Orders: PT Eval and Treat   Medical Diagnosis from Referral: S83.512A (ICD-10-CM) - Sprain of anterior cruciate ligament of left knee  Evaluation Date: 8/27/2024  Authorization Period Expiration: 8/12/2025  Plan of Care Expiration: 10/27/2024  Visit # / Visits authorized: 5/ 12  #Visits based on PHYSICAL THERAPY POC: 12     Foto  Date  Score      Knee   Lower Score = Greater Disability   #1/3 8/27/2024 70.7   #2/3     #3/3       Time in  1:50 pm   Time out 2:35 pm   Total Appointment Time  45 minutes      Precautions: Standard  Patient discussed MRI with Dr. Polk, who indicated a Gr III ACL tear at L knee.     Subjective     HOME EXERCISE PROGRAM  Reviewed, reports compliance    Response  Doing pretty good, has been able to avoid activities and movements that cause pain. 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      Pain:      Current 0/10     Location L medial knee      Objective    8/27/2024:  Observation/posture: sits with L LE extended, L gastroc atrophy     Gait: WNL    Range of Motion:   Knee Right  Left    Flexion 145 142   Extension +2 0     Lower Extremity Strength       Knee           Right          Left     Extension: 5/5 4-/5   Flexion: 4+/5 4+/5   Hip  Right  Left    Abduction  4+/5 4+/5     Function/balance:    Right  Left    Single leg stance  - 30", 30", 30" 20", 13", 30" L>R pes planus in " "SLS    Squat tba  - -     Joint Mobility:    Left    PFJ  Impaired    Knee joint  Impaired      Edema:    Joint line in cm 10 cm distal to tibial tuberosity in cm 5 cm above in cm   Right 38.0 36.5 40.0   Left  37.5 36.0 38.5      Treatment   + indicates new exercise   Bold indicates completed exercise    therapeutic exercises to develop strength, endurance, ROM, flexibility, posture, and core stabilization for 10 minutes:  L SLR, flex #2, +abd #3, +ext #3, +add #2, 3x10   L ankle DF/PF, +black band, 3x10   L arch doming, airex mat, 3" hold, 3x10  L TKE, green band, 3" hold, 3x10    manual therapy techniques: Joint mobilizations, Manual traction, Myofacial release, Soft tissue Mobilization, and Friction Massage for 00 minutes:  L PFJ, knee joint Gr III/II JM     neuromuscular re-education activities to improve: Balance, Coordination, Kinesthetic, Sense, Proprioception, and Posture for 10 minutes:  L quad sets, 5" hold, 3x10  L SLS, airex mat, +30"x5  Standing L HS curl 3 x 10  +BOSU squat 2 x 10    therapeutic activities to improve functional performance for 25 minutes:  +Eccentric lateral step down bottom stair 3x10 B HRA  +Standing clamshell 2 x 10 each, no resistance  Leg press, #75, +3x10  Leg press heel raise, 50#, 3x10  +Leg press L LE only, 37#, 2x10  Upright bike, lvl 3 resistance, seat height at 7, x 4'    May add: lateral monster walk (band above knees), single leg squat (goal to introduce Y balance)    gait training to improve functional mobility and safety for 00 minutes:      Home Exercises and Patient Education Provided  Education provided:   - yes    Home Exercises Provided: yes.  Exercises were reviewed and Freddy was able to demonstrate them prior to the end of the session.  Freddy demonstrated good  understanding of the education provided.     Assessment   Patient making improvements in knee mobility though open chain movements, some challenge with closed chain due to discomfort and weakness. " "Able to progress several exercises for improved closed chain motor function, L LE motor control and stability, and overall strength and endurance. Will benefit from further progression for single limb strength, endurance, and stability.     Pt presents with decreased ROM, muscle strength, flexibility, joint mobility. Increased pain, stiffness, soft tissue restriction. Impaired posture, joint mechanics, balance, gait pattern.     The patient's current task deficits include the following: limitation in ADL, self care, social/recreational tasks.     Patient prognosis: good  Rehab potential: good    Patient will benefit from skilled outpatient Physical Therapy to address the deficits stated above and in the chart below, provide patient /family education, to maximize patient's level of independence, and to address functional deficits.    Anticipated Barriers for therapy:  none    Goals:   Short Terms Goals: 3 weeks    Goal  Progress  Date    (1)  Patient will be I with HOME EXERCISE PROGRAM  Initial, Not Met 9/24/2024     (2)  Patient will obtain +2 deg L knee extension ACTIVE RANGE OF MOTION to indicate improved standing tolerance, > 60 minutes   Initial, Not Met  9/24/2024     (3)   Patient will obtain 30" L SLS average of 3 trials to indicate improved safety in negotiating obstacles in pathway   Initial, Not Met  9/24/2024       Long Term Goals: 6 weeks    Goal  Progress  Date    (1)  Patient will obtain 5/5 L knee ext MMT to indicate improved ability to make changes in direction while walking as required for work in coaching  Initial, Not Met  9/24/2024   (2)   Patient will pivot to make changes in direction with no episodes of instability at L knee to facilitate return to play with his children   Initial, Not Met 9/24/2024    (3)   Patient will negotiate 2 flights of stairs using alternating gait pattern, no HRA, to facilitate return to previous level of function   Initial, Not Met  9/24/2024       Plan     Plan of " care Certification: 9/24/2024 to 10/27/2024.    Outpatient Physical Therapy 2 times weekly for 6 weeks to include the following interventions: Cervical/Lumbar Traction, Electrical Stimulation re-eval, dry needling, Gait Training, Iontophoresis (with ), Manual Therapy, Moist Heat/ Ice, Neuromuscular Re-ed, Patient Education, Self Care, Therapeutic Activities, and Therapeutic Exercise.     Physical therapist and physical therapy assistant(s) will met face to face to discuss patient's treatment plan and progress towards established goals. Pt will be seen by a physical therapist minimally every 6th visit or every 30 days.    Linda Mercer, PTA  9/24/2024

## 2024-09-25 NOTE — PROGRESS NOTES
"[facility]   Physical Therapy Treatment Note      Name: Freddy Villegas  [facility] Number: [id number]    Therapy Diagnosis:   Encounter Diagnoses   Name Primary?    Sprain of anterior cruciate ligament of left knee, subsequent encounter Yes    Difficulty walking        Physician: Gaetano Polk MD    Physician Orders: PT Eval and Treat   Medical Diagnosis from Referral: S83.512A (ICD-10-CM) - Sprain of anterior cruciate ligament of left knee  Evaluation Date: 8/27/2024  Authorization Period Expiration: 8/12/2025  Plan of Care Expiration: 10/27/2024  Visit # / Visits authorized: 6/ 12  #Visits based on PHYSICAL THERAPY POC: 12     Foto  Date  Score      Knee   Lower Score = Greater Disability   #1/3 8/27/2024 70.7   #2/3     #3/3       Time in  1:00 pm   Time out 1:55 pm   Total Appointment Time  55 minutes      Precautions: Standard  Patient discussed MRI with Dr. Polk, who indicated a Gr III ACL tear at L knee.     Subjective     HOME EXERCISE PROGRAM  Reviewed, reports compliance    Response  Doing well, only had about an hour's worth of soreness after last session. 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      Pain:      Current 0/10     Location L medial knee      Objective    8/27/2024:  Observation/posture: sits with L LE extended, L gastroc atrophy     Gait: WNL    Range of Motion:   Knee Right  Left    Flexion 145 142   Extension +2 0     Lower Extremity Strength       Knee           Right          Left     Extension: 5/5 4-/5   Flexion: 4+/5 4+/5   Hip  Right  Left    Abduction  4+/5 4+/5     Function/balance:    Right  Left    Single leg stance  - 30", 30", 30" 20", 13", 30" L>R pes planus in SLS  " "  Squat tba  - -     Joint Mobility:    Left    PFJ  Impaired    Knee joint  Impaired      Edema:    Joint line in cm 10 cm distal to tibial tuberosity in cm 5 cm above in cm   Right 38.0 36.5 40.0   Left  37.5 36.0 38.5      Treatment   + indicates new exercise   Bold indicates completed exercise    therapeutic exercises to develop strength, endurance, ROM, flexibility, posture, and core stabilization for 15 minutes:  L SLR, flex #2, abd #3, ext #3, add #2, 3x10   +Single leg bridge 2 x 10 each  L ankle DF/PF, +black band, 3x10   L arch doming, airex mat, 3" hold, 3x10  L TKE, green band, 3" hold, 3x10    manual therapy techniques: Joint mobilizations, Manual traction, Myofacial release, Soft tissue Mobilization, and Friction Massage for 00 minutes:  L PFJ, knee joint Gr III/II JM     neuromuscular re-education activities to improve: Balance, Coordination, Kinesthetic, Sense, Proprioception, and Posture for 15 minutes:  L quad sets, 5" hold, 3x10  L SLS, airex mat, 30"x5  Standing L HS curl 3 x 10  BOSU squat 3 x 10  +Single leg RDL 2 x 10 R/L    therapeutic activities to improve functional performance for 25 minutes:  Eccentric lateral step down bottom stair 3x10 B HRA  +Standing clamshell 2 x 10 each, no resistance (add Y loop  +Hip abd walk with Y loop above knees x 2 laps  Leg press, #75, 3x10  Leg press heel raise, 50#, 3x10  Leg press L LE only, 37#, 2x10  Upright bike, lvl 3 resistance, seat height at 7, x 4'    May add: lateral monster walk (band above knees), single leg squat (goal to introduce Y balance)    gait training to improve functional mobility and safety for 00 minutes:      Home Exercises and Patient Education Provided  Education provided:   - yes    Home Exercises Provided: yes.  Exercises were reviewed and Freddy was able to demonstrate them prior to the end of the session.  Freddy demonstrated good  understanding of the education provided.     Assessment   Patient continues with L > R LE " "weakness and impaired endurance though is making slow gains in both. Muscle shaking and challenge with stabilization with SLS and RDL exercises which improves with repetitions. Continued motor control shaking with inverted BOSU squats but no knee instability noted. Slightly less fatigue today as compared to previous visit.  Will benefit from further progression for single limb strength, endurance, and stability.     Pt presents with decreased ROM, muscle strength, flexibility, joint mobility. Increased pain, stiffness, soft tissue restriction. Impaired posture, joint mechanics, balance, gait pattern.     The patient's current task deficits include the following: limitation in ADL, self care, social/recreational tasks.     Patient prognosis: good  Rehab potential: good    Patient will benefit from skilled outpatient Physical Therapy to address the deficits stated above and in the chart below, provide patient /family education, to maximize patient's level of independence, and to address functional deficits.    Anticipated Barriers for therapy:  none    Goals:   Short Terms Goals: 3 weeks    Goal  Progress  Date    (1)  Patient will be I with HOME EXERCISE PROGRAM  Initial, Not Met 9/26/2024     (2)  Patient will obtain +2 deg L knee extension ACTIVE RANGE OF MOTION to indicate improved standing tolerance, > 60 minutes   Initial, Not Met  9/26/2024     (3)   Patient will obtain 30" L SLS average of 3 trials to indicate improved safety in negotiating obstacles in pathway   Initial, Not Met  9/26/2024       Long Term Goals: 6 weeks    Goal  Progress  Date    (1)  Patient will obtain 5/5 L knee ext MMT to indicate improved ability to make changes in direction 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    Outpatient Physical Therapy 2 times weekly for 6 weeks to include the following interventions: Cervical/Lumbar Traction, Electrical Stimulation re-eval, dry needling, Gait Training, Iontophoresis (with ), Manual Therapy, Moist Heat/ Ice, Neuromuscular Re-ed, Patient Education, Self Care, Therapeutic Activities, and Therapeutic Exercise.     Physical therapist and physical therapy assistant(s) will met face to face to discuss patient's treatment plan and progress towards established goals. Pt will be seen by a physical therapist minimally every 6th visit or every 30 days.    Linda Mercer, PTA  9/26/2024

## 2024-09-26 ENCOUNTER — CLINICAL SUPPORT (OUTPATIENT)
Dept: REHABILITATION | Facility: HOSPITAL | Age: 34
End: 2024-09-26
Payer: COMMERCIAL

## 2024-09-26 DIAGNOSIS — S83.512D SPRAIN OF ANTERIOR CRUCIATE LIGAMENT OF LEFT KNEE, SUBSEQUENT ENCOUNTER: Primary | ICD-10-CM

## 2024-09-26 DIAGNOSIS — R26.2 DIFFICULTY WALKING: ICD-10-CM

## 2024-09-26 PROCEDURE — 97110 THERAPEUTIC EXERCISES: CPT | Mod: PN,CQ

## 2024-09-26 PROCEDURE — 97112 NEUROMUSCULAR REEDUCATION: CPT | Mod: PN,CQ

## 2024-09-26 PROCEDURE — 97530 THERAPEUTIC ACTIVITIES: CPT | Mod: PN,CQ

## 2024-10-01 ENCOUNTER — CLINICAL SUPPORT (OUTPATIENT)
Dept: REHABILITATION | Facility: HOSPITAL | Age: 34
End: 2024-10-01
Payer: COMMERCIAL

## 2024-10-01 DIAGNOSIS — S83.512D SPRAIN OF ANTERIOR CRUCIATE LIGAMENT OF LEFT KNEE, SUBSEQUENT ENCOUNTER: Primary | ICD-10-CM

## 2024-10-01 DIAGNOSIS — R26.2 DIFFICULTY WALKING: ICD-10-CM

## 2024-10-01 PROCEDURE — 97110 THERAPEUTIC EXERCISES: CPT | Mod: PN,CQ

## 2024-10-01 PROCEDURE — 97530 THERAPEUTIC ACTIVITIES: CPT | Mod: PN,CQ

## 2024-10-01 PROCEDURE — 97112 NEUROMUSCULAR REEDUCATION: CPT | Mod: PN,CQ

## 2024-10-01 NOTE — PROGRESS NOTES
"[facility]   Physical Therapy Treatment Note      Name: Freddy Villegas  [facility] Number: [id number]    Therapy Diagnosis:   Encounter Diagnoses   Name Primary?    Sprain of anterior cruciate ligament of left knee, subsequent encounter Yes    Difficulty walking        Physician: Gaetano Polk MD    Physician Orders: PT Eval and Treat   Medical Diagnosis from Referral: S83.512A (ICD-10-CM) - Sprain of anterior cruciate ligament of left knee  Evaluation Date: 8/27/2024  Authorization Period Expiration: 8/12/2025  Plan of Care Expiration: 10/27/2024  Visit # / Visits authorized: 7/ 13  #Visits based on PHYSICAL THERAPY POC: 12     Foto  Date  Score      Knee   Lower Score = Greater Disability   #1/3 8/27/2024 70.7   #2/3     #3/3       Time in  1:03 pm   Time out 1:50 pm   Total Appointment Time  48 minutes      Precautions: Standard  Patient discussed MRI with Dr. Polk, who indicated a Gr III ACL tear at L knee.     Subjective     HOME EXERCISE PROGRAM  Reviewed, reports compliance    Response  Knee doing progressively better. No recent feeling of pain or instability.    Current functional status  Improved ease and stability with stairs. Still avoiding any activity that may cause twisting or pivoting.   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      Pain:      Current 0/10     Location L medial knee      Objective    8/27/2024:  Observation/posture: sits with L LE extended, L gastroc atrophy     Gait: WNL    Range of Motion:   Knee Right  Left    Flexion 145 142   Extension +2 0     Lower Extremity Strength       Knee           Right          Left     Extension: 5/5 4-/5   Flexion: 4+/5 4+/5   Hip  Right  Left    Abduction  4+/5 4+/5     Function/balance:    Right  Left    Single leg stance  - 30", 30", 30" 20", 13", 30" L>R " "pes planus in SLS    Squat tba  - -     Joint Mobility:    Left    PFJ  Impaired    Knee joint  Impaired      Edema:    Joint line in cm 10 cm distal to tibial tuberosity in cm 5 cm above in cm   Right 38.0 36.5 40.0   Left  37.5 36.0 38.5      Treatment   + indicates new exercise   Bold indicates completed exercise    therapeutic exercises to develop strength, endurance, ROM, flexibility, posture, and core stabilization for 10 minutes:  L SLR, flex #3, abd #3, ext #3, add #3, 2x10   +Single leg bridge 2 x 10 each  L ankle DF/PF, +black band, 3x10   L arch doming, airex mat, 3" hold, 3x10  Standing L HS curl 3 x 10  L TKE, green band, 3" hold, 3x10    manual therapy techniques: Joint mobilizations, Manual traction, Myofacial release, Soft tissue Mobilization, and Friction Massage for 00 minutes:  L PFJ, knee joint Gr III/II JM     neuromuscular re-education activities to improve: Balance, Coordination, Kinesthetic, Sense, Proprioception, and Posture for 15 minutes:  L quad sets, 5" hold, 3x10  L SLS, airex mat, 30"x5  BOSU squat 3 x 10  +Fitter board rocking taps A/P, x 20 with BLE, x 15 with L LE only  Single leg RDL 2 x 10 R/L    therapeutic activities to improve functional performance for 23 minutes:  Eccentric lateral step down bottom stair 3x10 B HRA  Lateral monster walk, knees bent, Y loop above knees, x 2 laps in walkway  Standing clamshell, Y loop at knees, 2 x 10 each, no resistance (add Y loop  +Standing heel raise, hands on wall for greater ankle DF  +Single leg squat 2 x 10 with UHRA for balance  +Y balance L x 5 rounds, HRA as needed  Leg press, #75, 3x10  Leg press heel raise, 50#, 3x10  Leg press L LE only, 37#, 2x10  Upright bike, lvl 3 resistance, seat height at 7, x 4'    May add:     gait training to improve functional mobility and safety for 00 minutes:      Home Exercises and Patient Education Provided  Education provided:   - yes    Home Exercises Provided: yes.  Exercises were reviewed and " "Freddy was able to demonstrate them prior to the end of the session.  Freddy demonstrated good  understanding of the education provided.     Assessment   Patient making continual progress of LLE strength, stability and endurance. Does continue with strength and endurance limitations however knee appears stable with all activities. Wore brace for Y balance for security though was not needed. Shuttle exercises held due to time constraints. Not quite ready for plyometric activities but working on gaining strength of affected leg to 80% of non-affected leg. Less shaking with BOSU squats. Will benefit from further progression for single limb strength, endurance, and stability.     Pt presents with decreased ROM, muscle strength, flexibility, joint mobility. Increased pain, stiffness, soft tissue restriction. Impaired posture, joint mechanics, balance, gait pattern.     The patient's current task deficits include the following: limitation in ADL, self care, social/recreational tasks.     Patient prognosis: good  Rehab potential: good    Patient will benefit from skilled outpatient Physical Therapy to address the deficits stated above and in the chart below, provide patient /family education, to maximize patient's level of independence, and to address functional deficits.    Anticipated Barriers for therapy:  none    Goals:   Short Terms Goals: 3 weeks    Goal  Progress  Date    (1)  Patient will be I with HOME EXERCISE PROGRAM  Initial, Not Met 10/1/2024     (2)  Patient will obtain +2 deg L knee extension ACTIVE RANGE OF MOTION to indicate improved standing tolerance, > 60 minutes   Initial, Not Met  10/1/2024     (3)   Patient will obtain 30" L SLS average of 3 trials to indicate improved safety in negotiating obstacles in pathway   Initial, Not Met  10/1/2024       Long Term Goals: 6 weeks    Goal  Progress  Date    (1)  Patient will obtain 5/5 L knee ext MMT to indicate improved ability to make changes in direction " while 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    Outpatient Physical Therapy 2 times weekly for 6 weeks to include the following interventions: Cervical/Lumbar Traction, Electrical Stimulation re-eval, dry needling, Gait Training, Iontophoresis (with ), Manual Therapy, Moist Heat/ Ice, Neuromuscular Re-ed, Patient Education, Self Care, Therapeutic Activities, and Therapeutic Exercise.     Physical therapist and physical therapy assistant(s) will met face to face to discuss patient's treatment plan and progress towards established goals. Pt will be seen by a physical therapist minimally every 6th visit or every 30 days.    Linda Mercer, PTA  10/1/2024

## 2024-10-07 ENCOUNTER — CLINICAL SUPPORT (OUTPATIENT)
Dept: REHABILITATION | Facility: HOSPITAL | Age: 34
End: 2024-10-07
Payer: COMMERCIAL

## 2024-10-07 DIAGNOSIS — R26.2 DIFFICULTY WALKING: ICD-10-CM

## 2024-10-07 DIAGNOSIS — S83.512D SPRAIN OF ANTERIOR CRUCIATE LIGAMENT OF LEFT KNEE, SUBSEQUENT ENCOUNTER: Primary | ICD-10-CM

## 2024-10-07 PROCEDURE — 97110 THERAPEUTIC EXERCISES: CPT | Mod: PN | Performed by: PHYSICAL THERAPIST

## 2024-10-07 PROCEDURE — 97112 NEUROMUSCULAR REEDUCATION: CPT | Mod: PN | Performed by: PHYSICAL THERAPIST

## 2024-10-07 PROCEDURE — 97530 THERAPEUTIC ACTIVITIES: CPT | Mod: PN | Performed by: PHYSICAL THERAPIST

## 2024-10-07 NOTE — PROGRESS NOTES
"[facility]   Physical Therapy Progress Note      Name: Freddy Villegas  [facility] Number: [id number]    Therapy Diagnosis:   Encounter Diagnoses   Name Primary?    Sprain of anterior cruciate ligament of left knee, subsequent encounter Yes    Difficulty walking      Physician: Gaetano Polk MD    Physician Orders: PT Eval and Treat   Medical Diagnosis from Referral: S83.512A (ICD-10-CM) - Sprain of anterior cruciate ligament of left knee  Evaluation Date: 8/27/2024  Authorization Period Expiration:12/31/2024  Plan of Care Expiration: 12/7/2024  Visit # / Visits authorized: 8/ 13  #Visits based on PHYSICAL THERAPY POC: 12     Foto  Date  Score      Knee   Lower Score = Greater Disability   #1/3 8/27/2024 70.7   #2/3 10/7/2024 79.9   #3/3       Time in  1:07    Time out 2:15pm   Total Appointment Time  68 minutes      Precautions: Standard  Patient discussed MRI with Dr. Polk, who indicated a Gr III ACL tear at L knee.     Subjective     HOME EXERCISE PROGRAM  Reviewed, reports compliance    Response  Knee doing progressively better. No recent feeling of pain or instability with basic movements.    Current functional status  Improved ease and stability with stairs. Still avoiding any activity that may cause twisting or pivoting.   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      Pain:      Current 0/10     Location L medial knee      Objective    10/7/2024:  Observation/posture: sits with L LE extended, L gastroc atrophy     Gait: WNL    Range of Motion:   Knee Right  Left    Flexion 145 146   Extension +2 +1     Lower Extremity Strength       Knee           Right          Left     Extension: 5/5 4-/5   Flexion: 4+/5 4+/5   Hip  Right  Left    Abduction  4+/5 4+/5     Function/balance:    Right  Left    Single leg stance  - 30", " "30", 30" 20", 13", 30" L>R pes planus in SLS    Squat tba  - -     Joint Mobility:    Left    PFJ  Impaired    Knee joint  Impaired      Edema:    Joint line in cm 10 cm distal to tibial tuberosity in cm 5 cm above in cm   Right 38.0 36.5 40.0   Left  37.5 36.4 39.0     Treatment   + indicates new exercise   Bold indicates completed exercise    therapeutic exercises to develop strength, endurance, ROM, flexibility, posture, and core stabilization for 15 minutes:  L SLR, flex #3, abd #3, ext #3, add #3, 2x10   Single leg bridge 2 x 10 each  L ankle DF/PF, +black band, 3x10   L arch doming, airex mat, 3" hold, 3x10  +Updated PHYSICAL THERAPY POC  Standing L HS curl 3 x 10  L TKE, green band, 3" hold, 3x10    manual therapy techniques: Joint mobilizations, Manual traction, Myofacial release, Soft tissue Mobilization, and Friction Massage for 5 minutes:  L PFJ, knee joint Gr III/II JM     neuromuscular re-education activities to improve: Balance, Coordination, Kinesthetic, Sense, Proprioception, and Posture for 25 minutes:  L quad sets, 5" hold, 3x10  L SLS, airex mat, 30"x5  BOSU squat 3 x 10  Fitter board rocking taps A/P, 2x15 with L LE only  +Fitter board med/lat weight shifts, 3x10   Single leg RDL 2 x 10 R/L    therapeutic activities to improve functional performance for 23 minutes:  Eccentric lateral step down bottom stair 3x10 B HRA  Lateral monster walk, knees bent, Y loop above knees, x 2 laps in walkway  Standing clamshell, Y loop at knees, 2 x 10 each,   Standing heel raise, hands on wall for greater ankle DF, 3x10  Single leg squat 2 x 10 with UHRA for balance  Y balance L x 5 rounds, HRA as needed  Leg press, #75, 3x10  Leg press heel raise, 50#, 3x10  Leg press L LE only, 37#, 2x10  Upright bike, lvl 3 resistance, seat height at 7, x 4'    May add:     gait training to improve functional mobility and safety for 00 minutes:      Home Exercises and Patient Education Provided  Education provided:   - " "yes    Home Exercises Provided: yes.  Exercises were reviewed and Freddy was able to demonstrate them prior to the end of the session.  Freddy demonstrated good  understanding of the education provided.     Assessment   L knee ext strength remains most impaired at this time. His flex/ext ACTIVE RANGE OF MOTION is improving but not yet equal to R side. He requires continued emphasis on strengthening, stability and functional training to optimize stability and strength throughout more challenging functional tasks.     Pt presents with decreased ROM, muscle strength, flexibility, joint mobility. Increased pain, stiffness, soft tissue restriction. Impaired posture, joint mechanics, balance, gait pattern.     The patient's current task deficits include the following: limitation in ADL, self care, social/recreational tasks.     Patient prognosis: good  Rehab potential: good    Patient will benefit from skilled outpatient Physical Therapy to address the deficits stated above and in the chart below, provide patient /family education, to maximize patient's level of independence, and to address functional deficits.    Anticipated Barriers for therapy:  none    Goals:   Short Terms Goals: 3 weeks    Goal  Progress  Date    (1)  Patient will be I with HOME EXERCISE PROGRAM  Progressing, Not Met 10/7/2024     (2)  Patient will obtain +2 deg L knee extension ACTIVE RANGE OF MOTION to indicate improved standing tolerance, > 60 minutes   Progressing,  Nearly Met  10/7/2024     (3)   Patient will obtain 30" L SLS average of 3 trials to indicate improved safety in negotiating obstacles in pathway   Progressing, Not Met  10/7/2024       Long Term Goals: 6 weeks    Goal  Progress  Date    (1)  Patient will obtain 5/5 L knee ext MMT to indicate improved ability to make changes in 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    Outpatient Physical Therapy 2 times weekly for 2-4 weeks to include the following interventions: Cervical/Lumbar Traction, Electrical Stimulation re-eval, dry needling, Gait Training, Iontophoresis (with ), Manual Therapy, Moist Heat/ Ice, Neuromuscular Re-ed, Patient Education, Self Care, Therapeutic Activities, and Therapeutic Exercise.     Physical therapist and physical therapy assistant(s) will met face to face to discuss patient's treatment plan and progress towards established goals. Pt will be seen by a physical therapist minimally every 6th visit or every 30 days.    Stacie Diaz, PT  10/6/2024    I CERTIFY THE NEED FOR THESE SERVICES FURNISHED UNDER THIS PLAN OF TREATMENT AND WHILE UNDER MY CARE     Physician's comments:           Physician's Signature: ___________________________________________________

## 2024-10-10 ENCOUNTER — CLINICAL SUPPORT (OUTPATIENT)
Dept: REHABILITATION | Facility: HOSPITAL | Age: 34
End: 2024-10-10
Payer: COMMERCIAL

## 2024-10-10 DIAGNOSIS — S83.512D SPRAIN OF ANTERIOR CRUCIATE LIGAMENT OF LEFT KNEE, SUBSEQUENT ENCOUNTER: Primary | ICD-10-CM

## 2024-10-10 DIAGNOSIS — R26.2 DIFFICULTY WALKING: ICD-10-CM

## 2024-10-10 PROCEDURE — 97112 NEUROMUSCULAR REEDUCATION: CPT | Mod: PN,CQ

## 2024-10-10 PROCEDURE — 97110 THERAPEUTIC EXERCISES: CPT | Mod: PN,CQ

## 2024-10-10 PROCEDURE — 97530 THERAPEUTIC ACTIVITIES: CPT | Mod: PN,CQ

## 2024-10-10 NOTE — PROGRESS NOTES
"[facility]   Physical Therapy Treatment Note      Name: Freddy Villegas  [facility] Number: [id number]    Therapy Diagnosis:   Encounter Diagnoses   Name Primary?    Sprain of anterior cruciate ligament of left knee, subsequent encounter Yes    Difficulty walking        Physician: Gaetano Polk MD    Physician Orders: PT Eval and Treat   Medical Diagnosis from Referral: S83.512A (ICD-10-CM) - Sprain of anterior cruciate ligament of left knee  Evaluation Date: 8/27/2024  Authorization Period Expiration: 8/12/2025  Plan of Care Expiration: 10/27/2024  Visit # / Visits authorized: 9/ 13  #Visits based on PHYSICAL THERAPY POC: 12     Foto  Date  Score      Knee   Lower Score = Greater Disability   #1/3 8/27/2024 70.7   #2/3     #3/3       Time in  1:05 pm   Time out 2:00 pm   Total Appointment Time  55 minutes     Precautions: Standard  Patient discussed MRI with Dr. Polk, who indicated a Gr III ACL tear at L knee.     Subjective     HOME EXERCISE PROGRAM  Reviewed, reports compliance    Response  Knee getting stronger and feeling more stable. Wearing the brace less frequently    Current functional status  Improved ease and stability with stairs. Still avoiding any activity that may cause twisting or pivoting.   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      Pain:      Current 0/10     Location L medial knee      Objective    10/7/2024:  Observation/posture: sits with L LE extended, L gastroc atrophy     Gait: WNL    Range of Motion:   Knee Right  Left    Flexion 145 146   Extension +2 +1     Lower Extremity Strength       Knee           Right          Left     Extension: 5/5 4-/5   Flexion: 4+/5 4+/5   Hip  Right  Left    Abduction  4+/5 4+/5     Function/balance:    Right  Left    Single leg stance  - 30", 30", 30" 20", 13", 30" " "L>R pes planus in SLS    Squat tba  - -     Joint Mobility:    Left    PFJ  Impaired    Knee joint  Impaired      Edema:    Joint line in cm 10 cm distal to tibial tuberosity in cm 5 cm above in cm   Right 38.0 36.5 40.0   Left  37.5 36.4 39.0     Treatment   + indicates new exercise   Bold indicates completed exercise    therapeutic exercises to develop strength, endurance, ROM, flexibility, posture, and core stabilization for 8 minutes:  L SLR, flex #3, abd #3, ext #3, add #3, 2x10   Single leg bridge 2 x 10 each  +Quadruped bird dog (legs only) x 10 each, alternating  L ankle DF/PF, +black band, 3x10   L arch doming, airex mat, 3" hold, x 10 bilateral, x 10 each unilateral  Standing L HS curl 3 x 10  L TKE, green band, 3" hold, 3x10    manual therapy techniques: Joint mobilizations, Manual traction, Myofacial release, Soft tissue Mobilization, and Friction Massage for 00 minutes:  L PFJ, knee joint Gr III/II JM     neuromuscular re-education activities to improve: Balance, Coordination, Kinesthetic, Sense, Proprioception, and Posture for 17 minutes:  L quad sets, 5" hold, 3x10  L SLS, airex mat, 30"x3  BOSU squat 3 x 10  Fitter board rocking taps A/P, 2x15 with L LE only  Fitter board med/lat weight shifts, 3x10   Single leg RDL 2 x 10 R/L    therapeutic activities to improve functional performance for 30 minutes:  Eccentric lateral step down bottom stair 3x10 B HRA  Lateral monster walk, knees bent, Y loop above knees, x 2 laps in walkway  Standing clamshell, Y loop at knees, 3 x 10 each,   Standing B heel raise, L heel lower, hands on wall for greater ankle DF, 3x10  Single leg squat 2 x 10 with UHRA for balance  Y balance L x 5 rounds, HRA as needed  +Shuttle jump B LEs, 1 bottom band, x 30  +Shuttle alternating hop, 1 bottom band, x 15 each - fatigue but no pain or instability  Leg press, #75, 3x10  Leg press heel raise, 50#, 3x10  Leg press L LE only, 37#, 2x10  Upright bike, lvl 3 resistance, seat height at " "7, x 4'    May add:     gait training to improve functional mobility and safety for 00 minutes:      Home Exercises and Patient Education Provided  Education provided:   - yes    Home Exercises Provided: yes.  Exercises were reviewed and Freddy was able to demonstrate them prior to the end of the session.  Freddy demonstrated good  understanding of the education provided.     Assessment   Patient tolerated progression of LE strength, endurance, and stability well. Able to progress into light plyometric jumping in landing assisted on shuttle press with good tolerance. Very good balance strategies while on airex foam and fitter board. Less shaking with BOSU squats and can achieve deeper depth. Will benefit from further progression for single limb strength, endurance, and stability. Continue slow progression into plyometric activity.       Patient presents with decreased ROM, muscle strength, flexibility, joint mobility. Increased pain, stiffness, soft tissue restriction. Impaired posture, joint mechanics, balance, gait pattern.     The patient's current task deficits include the following: limitation in ADL, self care, social/recreational tasks.     Patient prognosis: good  Rehab potential: good    Patient will benefit from skilled outpatient Physical Therapy to address the deficits stated above and in the chart below, provide patient /family education, to maximize patient's level of independence, and to address functional deficits.    Anticipated Barriers for therapy:  none    Goals:   Short Terms Goals: 3 weeks    Goal  Progress  Date    (1)  Patient will be I with HOME EXERCISE PROGRAM  Initial, Not Met 10/10/2024     (2)  Patient will obtain +2 deg L knee extension ACTIVE RANGE OF MOTION to indicate improved standing tolerance, > 60 minutes   Initial, Not Met  10/10/2024     (3)   Patient will obtain 30" L SLS average of 3 trials to indicate improved safety in negotiating obstacles in pathway   Initial, Not Met  " 10/10/2024       Long Term Goals: 6 weeks    Goal  Progress  Date    (1)  Patient will obtain 5/5 L knee ext MMT to indicate improved ability to make changes in direction 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    Outpatient Physical Therapy 2 times weekly for 6 weeks to include the following interventions: Cervical/Lumbar Traction, Electrical Stimulation re-eval, dry needling, Gait Training, Iontophoresis (with ), Manual Therapy, Moist Heat/ Ice, Neuromuscular Re-ed, Patient Education, Self Care, Therapeutic Activities, and Therapeutic Exercise.     Physical therapist and physical therapy assistant(s) will met face to face to discuss patient's treatment plan and progress towards established goals. Pt will be seen by a physical therapist minimally every 6th visit or every 30 days.    Linda Mercer, PTA  10/10/2024

## 2024-10-10 NOTE — PLAN OF CARE
"[facility]   Physical Therapy Progress Note      Name: Freddy Villegas  [facility] Number: [id number]    Therapy Diagnosis:   Encounter Diagnoses   Name Primary?    Sprain of anterior cruciate ligament of left knee, subsequent encounter Yes    Difficulty walking      Physician: Gaetano Polk MD    Physician Orders: PT Eval and Treat   Medical Diagnosis from Referral: S83.512A (ICD-10-CM) - Sprain of anterior cruciate ligament of left knee  Evaluation Date: 8/27/2024  Authorization Period Expiration:12/31/2024  Plan of Care Expiration: 12/7/2024  Visit # / Visits authorized: 8/ 13  #Visits based on PHYSICAL THERAPY POC: 12     Foto  Date  Score      Knee   Lower Score = Greater Disability   #1/3 8/27/2024 70.7   #2/3 10/7/2024 79.9   #3/3       Time in  1:07    Time out 2:15pm   Total Appointment Time  68 minutes      Precautions: Standard  Patient discussed MRI with Dr. Polk, who indicated a Gr III ACL tear at L knee.     Subjective     HOME EXERCISE PROGRAM  Reviewed, reports compliance    Response  Knee doing progressively better. No recent feeling of pain or instability with basic movements.    Current functional status  Improved ease and stability with stairs. Still avoiding any activity that may cause twisting or pivoting.   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      Pain:      Current 0/10     Location L medial knee      Objective    10/7/2024:  Observation/posture: sits with L LE extended, L gastroc atrophy     Gait: WNL    Range of Motion:   Knee Right  Left    Flexion 145 146   Extension +2 +1     Lower Extremity Strength       Knee           Right          Left     Extension: 5/5 4-/5   Flexion: 4+/5 4+/5   Hip  Right  Left    Abduction  4+/5 4+/5     Function/balance:    Right  Left    Single leg stance  - 30", " "30", 30" 20", 13", 30" L>R pes planus in SLS    Squat tba  - -     Joint Mobility:    Left    PFJ  Impaired    Knee joint  Impaired      Edema:    Joint line in cm 10 cm distal to tibial tuberosity in cm 5 cm above in cm   Right 38.0 36.5 40.0   Left  37.5 36.4 39.0     Treatment   + indicates new exercise   Bold indicates completed exercise    therapeutic exercises to develop strength, endurance, ROM, flexibility, posture, and core stabilization for 15 minutes:  L SLR, flex #3, abd #3, ext #3, add #3, 2x10   Single leg bridge 2 x 10 each  L ankle DF/PF, +black band, 3x10   L arch doming, airex mat, 3" hold, 3x10  +Updated PHYSICAL THERAPY POC  Standing L HS curl 3 x 10  L TKE, green band, 3" hold, 3x10    manual therapy techniques: Joint mobilizations, Manual traction, Myofacial release, Soft tissue Mobilization, and Friction Massage for 5 minutes:  L PFJ, knee joint Gr III/II JM     neuromuscular re-education activities to improve: Balance, Coordination, Kinesthetic, Sense, Proprioception, and Posture for 25 minutes:  L quad sets, 5" hold, 3x10  L SLS, airex mat, 30"x5  BOSU squat 3 x 10  Fitter board rocking taps A/P, 2x15 with L LE only  +Fitter board med/lat weight shifts, 3x10   Single leg RDL 2 x 10 R/L    therapeutic activities to improve functional performance for 23 minutes:  Eccentric lateral step down bottom stair 3x10 B HRA  Lateral monster walk, knees bent, Y loop above knees, x 2 laps in walkway  Standing clamshell, Y loop at knees, 2 x 10 each,   Standing heel raise, hands on wall for greater ankle DF, 3x10  Single leg squat 2 x 10 with UHRA for balance  Y balance L x 5 rounds, HRA as needed  Leg press, #75, 3x10  Leg press heel raise, 50#, 3x10  Leg press L LE only, 37#, 2x10  Upright bike, lvl 3 resistance, seat height at 7, x 4'    May add:     gait training to improve functional mobility and safety for 00 minutes:      Home Exercises and Patient Education Provided  Education provided:   - " "yes    Home Exercises Provided: yes.  Exercises were reviewed and Freddy was able to demonstrate them prior to the end of the session.  Freddy demonstrated good  understanding of the education provided.     Assessment   L knee ext strength remains most impaired at this time. His flex/ext ACTIVE RANGE OF MOTION is improving but not yet equal to R side. He requires continued emphasis on strengthening, stability and functional training to optimize stability and strength throughout more challenging functional tasks.     Pt presents with decreased ROM, muscle strength, flexibility, joint mobility. Increased pain, stiffness, soft tissue restriction. Impaired posture, joint mechanics, balance, gait pattern.     The patient's current task deficits include the following: limitation in ADL, self care, social/recreational tasks.     Patient prognosis: good  Rehab potential: good    Patient will benefit from skilled outpatient Physical Therapy to address the deficits stated above and in the chart below, provide patient /family education, to maximize patient's level of independence, and to address functional deficits.    Anticipated Barriers for therapy: none    Goals:   Short Terms Goals: 3 weeks    Goal  Progress  Date    (1)  Patient will be I with HOME EXERCISE PROGRAM  Progressing, Not Met 10/7/2024     (2)  Patient will obtain +2 deg L knee extension ACTIVE RANGE OF MOTION to indicate improved standing tolerance, > 60 minutes   Progressing,  Nearly Met  10/7/2024     (3)   Patient will obtain 30" L SLS average of 3 trials to indicate improved safety in negotiating obstacles in pathway   Progressing, Not Met  10/7/2024       Long Term Goals: 6 weeks    Goal  Progress  Date    (1)  Patient will obtain 5/5 L knee ext MMT to indicate improved ability to make changes in 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    Outpatient Physical Therapy 2 times weekly for 2-4 weeks to include the following interventions: Cervical/Lumbar Traction, Electrical Stimulation re-eval, dry needling, Gait Training, Iontophoresis (with ), Manual Therapy, Moist Heat/ Ice, Neuromuscular Re-ed, Patient Education, Self Care, Therapeutic Activities, and Therapeutic Exercise.     Physical therapist and physical therapy assistant(s) will met face to face to discuss patient's treatment plan and progress towards established goals. Pt will be seen by a physical therapist minimally every 6th visit or every 30 days.    Stacie Diaz, PT  10/6/2024    I CERTIFY THE NEED FOR THESE SERVICES FURNISHED UNDER THIS PLAN OF TREATMENT AND WHILE UNDER MY CARE     Physician's comments:           Physician's Signature: ___________________________________________________

## 2024-10-14 ENCOUNTER — CLINICAL SUPPORT (OUTPATIENT)
Dept: REHABILITATION | Facility: HOSPITAL | Age: 34
End: 2024-10-14
Payer: COMMERCIAL

## 2024-10-14 DIAGNOSIS — S83.512D SPRAIN OF ANTERIOR CRUCIATE LIGAMENT OF LEFT KNEE, SUBSEQUENT ENCOUNTER: Primary | ICD-10-CM

## 2024-10-14 DIAGNOSIS — R26.2 DIFFICULTY WALKING: ICD-10-CM

## 2024-10-14 PROCEDURE — 97112 NEUROMUSCULAR REEDUCATION: CPT | Mod: PN | Performed by: PHYSICAL THERAPIST

## 2024-10-14 PROCEDURE — 97110 THERAPEUTIC EXERCISES: CPT | Mod: PN | Performed by: PHYSICAL THERAPIST

## 2024-10-14 PROCEDURE — 97530 THERAPEUTIC ACTIVITIES: CPT | Mod: PN | Performed by: PHYSICAL THERAPIST

## 2024-10-14 NOTE — PROGRESS NOTES
"[facility]   Physical Therapy Treatment Note      Name: Freddy Villegas  [facility] Number: [id number]    Therapy Diagnosis:   Encounter Diagnoses   Name Primary?    Sprain of anterior cruciate ligament of left knee, subsequent encounter Yes    Difficulty walking      Physician: Gaetano Polk MD    Physician Orders: PT Eval and Treat   Medical Diagnosis from Referral: S83.512A (ICD-10-CM) - Sprain of anterior cruciate ligament of left knee  Evaluation Date: 8/27/2024  Authorization Period Expiration: 8/12/2025  Plan of Care Expiration: 10/27/2024  Visit # / Visits authorized: 10/ 13  #Visits based on PHYSICAL THERAPY POC: 12     Foto  Date  Score      Knee   Lower Score = Greater Disability   #1/3 8/27/2024 70.7   #2/3 10/7/2024 79.9   #3/3       Time in  1:08pm   Time out 2:03pm   Total Appointment Time  55 minutes      Precautions: Standard  Patient discussed MRI with Dr. Polk, who indicated a Gr III ACL tear at L knee.     Subjective     HOME EXERCISE PROGRAM  Reviewed, reports compliance    Response  Knee getting stronger and feeling more stable. Wearing the brace less frequently    Current functional status  Still avoiding any activity that may cause twisting or pivoting.   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      Pain:      Current 0/10     Location L medial knee      Objective    10/14/2024:  Observation/posture: sits with L LE extended, L gastroc atrophy     Gait: WNL    Range of Motion:   Knee Right  Left    Flexion 145 146   Extension +2 +1     Lower Extremity Strength       Knee           Right          Left     Extension: 5/5 4/5   Flexion: 4+/5 4+/5   Hip  Right  Left    Abduction  4+/5 4+/5     Function/balance:    Right  Left    Single leg stance  - 30", 30", 30" 20", 13", 30" L>R pes planus in SLS  " "  Squat tba  - -     Joint Mobility:    Left    PFJ  Impaired    Knee joint  Impaired      Edema:    Joint line in cm 10 cm distal to tibial tuberosity in cm 5 cm above in cm   Right 38.0 36.5 40.0   Left  37.5 36.4 39.0     Treatment   + indicates new exercise   Bold indicates completed exercise    therapeutic exercises to develop strength, endurance, ROM, flexibility, posture, and core stabilization for 10 minutes:  L SLR, flex #3, abd #3, ext #3, add #3, 2x10   R/L single leg bridge, +half foam under feet, 3 x 10 each  Quadruped bird dog (legs only), half foam on back, 3 x 10 each   L ankle DF/PF, +black band, 3x10   Standing L HS curl 3 x 10  L TKE, green band, 3" hold, 3x10    manual therapy techniques: Joint mobilizations, Manual traction, Myofacial release, Soft tissue Mobilization, and Friction Massage for 00 minutes:  L PFJ, knee joint Gr III/II JM     neuromuscular re-education activities to improve: Balance, Coordination, Kinesthetic, Sense, Proprioception, and Posture for 20 minutes:  L SLS with 3 point cone tap, airex mat, 30"x5  BOSU squat, 3" hold, 3 x 10  Fitter board rocking taps A/P, 2x15 with L LE only  Fitter board med/lat weight shifts, 3x10   Single leg RDL, #10, 2 x 10 R/L    therapeutic activities to improve functional performance for 25 minutes:  Eccentric lateral step down bottom stair 3x10 B HRA  Lateral monster walk, knees bent, +green loop above knees, x 2 laps in walkway  +Forward/retro monster walk, knees bent, green loop above knees x 2 laps  Standing clamshell, +green loop at knees, 3 x 10 each  Standing B heel raise, L heel lower, hands on wall for greater ankle DF, 3x10  Single leg squat 2 x 10 with UHRA for balance  Y balance L x 5 rounds, HRA as needed  Shuttle jump B LEs, 1 bottom band, 3x10  Shuttle alternating hop, 1 bottom band, 2 x 15 each  Leg press, #75, 3x10  Leg press heel raise, 50#, 3x10  Leg press L LE only, 37#, 2x10  Upright bike, lvl 3 resistance, seat height at 7, " "x 4'    gait training to improve functional mobility and safety for 00 minutes:      Home Exercises and Patient Education Provided  Education provided:   - yes    Home Exercises Provided: yes.  Exercises were reviewed and Freddy was able to demonstrate them prior to the end of the session.  Freddy demonstrated good  understanding of the education provided.     Assessment   L LE muscle fatigue with fasciculations during progression of functional strengthening including forward/ retro monster walk. However, able to complete strengthening with green loop band today. Green band provided for use at home between visits.      Patient presents with decreased ROM, muscle strength, flexibility, joint mobility. Increased pain, stiffness, soft tissue restriction. Impaired posture, joint mechanics, balance, gait pattern.     The patient's current task deficits include the following: limitation in ADL, self care, social/recreational tasks.     Patient prognosis: good  Rehab potential: good    Patient will benefit from skilled outpatient Physical Therapy to address the deficits stated above and in the chart below, provide patient /family education, to maximize patient's level of independence, and to address functional deficits.    Anticipated Barriers for therapy:  none    Goals:   Short Terms Goals: 3 weeks    Goal  Progress  Date    (1)  Patient will be I with HOME EXERCISE PROGRAM  Initial, Not Met 10/14/2024     (2)  Patient will obtain +2 deg L knee extension ACTIVE RANGE OF MOTION to indicate improved standing tolerance, > 60 minutes   Initial, Not Met  10/14/2024     (3)   Patient will obtain 30" L SLS average of 3 trials to indicate improved safety in negotiating obstacles in pathway   Initial, Not Met  10/14/2024       Long Term Goals: 6 weeks    Goal  Progress  Date    (1)  Patient will obtain 5/5 L knee ext MMT to indicate improved ability to make changes in direction while walking as required for work in coaching  " Initial, Not Met  10/14/2024   (2)   Patient will pivot to make changes in direction with no episodes of instability at L knee to facilitate return to play with his children   Initial, Not Met 10/14/2024    (3)   Patient will negotiate 2 flights of stairs using alternating gait pattern, no HRA, to facilitate return to previous level of function   Initial, Not Met  10/14/2024       Plan     Plan of care Certification: 10/14/2024 to 10/27/2024.    Outpatient Physical Therapy 2 times weekly for 6 weeks to include the following interventions: Cervical/Lumbar Traction, Electrical Stimulation re-eval, dry needling, Gait Training, Iontophoresis (with ), Manual Therapy, Moist Heat/ Ice, Neuromuscular Re-ed, Patient Education, Self Care, Therapeutic Activities, and Therapeutic Exercise.     Physical therapist and physical therapy assistant(s) will met face to face to discuss patient's treatment plan and progress towards established goals. Pt will be seen by a physical therapist minimally every 6th visit or every 30 days.    Stacie Diaz, PT  10/13/2024

## 2024-10-17 ENCOUNTER — CLINICAL SUPPORT (OUTPATIENT)
Dept: REHABILITATION | Facility: HOSPITAL | Age: 34
End: 2024-10-17
Payer: COMMERCIAL

## 2024-10-17 DIAGNOSIS — R26.2 DIFFICULTY WALKING: ICD-10-CM

## 2024-10-17 DIAGNOSIS — S83.512D SPRAIN OF ANTERIOR CRUCIATE LIGAMENT OF LEFT KNEE, SUBSEQUENT ENCOUNTER: Primary | ICD-10-CM

## 2024-10-17 PROCEDURE — 97530 THERAPEUTIC ACTIVITIES: CPT | Mod: PN,CQ

## 2024-10-17 PROCEDURE — 97110 THERAPEUTIC EXERCISES: CPT | Mod: PN,CQ

## 2024-10-17 PROCEDURE — 97112 NEUROMUSCULAR REEDUCATION: CPT | Mod: PN,CQ

## 2024-10-17 NOTE — PROGRESS NOTES
"[facility]   Physical Therapy Treatment Note      Name: Fredyd Villegas  [facility] Number: [id number]    Therapy Diagnosis:   Encounter Diagnoses   Name Primary?    Sprain of anterior cruciate ligament of left knee, subsequent encounter Yes    Difficulty walking      Physician: Gaetano Polk MD    Physician Orders: PT Eval and Treat   Medical Diagnosis from Referral: S83.512A (ICD-10-CM) - Sprain of anterior cruciate ligament of left knee  Evaluation Date: 8/27/2024  Authorization Period Expiration: 8/12/2025  Plan of Care Expiration: 10/27/2024  Visit # / Visits authorized: 10/ 13  #Visits based on PHYSICAL THERAPY POC: 12     Foto  Date  Score      Knee   Lower Score = Greater Disability   #1/3 8/27/2024 70.7   #2/3 10/7/2024 79.9   #3/3       Time in  1:50 pm   Time out 2:35 pm   Total Appointment Time  45 minutes      Precautions: Standard  Patient discussed MRI with Dr. Polk, who indicated a Gr III ACL tear at L knee.     Subjective     HOME EXERCISE PROGRAM  Reviewed, reports compliance    Response  Felt good after last therapy session, short lived muscle fatigue. Feels like less activities make his leg shake    Current functional status  Still avoiding any activity that may cause twisting or pivoting.   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      Pain:      Current 0/10     Location L medial knee      Objective    10/14/2024:  Observation/posture: sits with L LE extended, L gastroc atrophy     Gait: WNL    Range of Motion:   Knee Right  Left    Flexion 145 146   Extension +2 +1     Lower Extremity Strength       Knee           Right          Left     Extension: 5/5 4/5   Flexion: 4+/5 4+/5   Hip  Right  Left    Abduction  4+/5 4+/5     Function/balance:    Right  Left    Single leg stance  - 30", 30", 30" 20", 13", " "30" L>R pes planus in SLS    Squat tba  - -     Joint Mobility:    Left    PFJ  Impaired    Knee joint  Impaired      Edema:    Joint line in cm 10 cm distal to tibial tuberosity in cm 5 cm above in cm   Right 38.0 36.5 40.0   Left  37.5 36.4 39.0     Treatment   + indicates new exercise   Bold indicates completed exercise    therapeutic exercises to develop strength, endurance, ROM, flexibility, posture, and core stabilization for 10 minutes:  L SLR, flex #3, abd #3, ext #3, add #3, 2x10   R/L single leg bridge, +half foam under feet, 3 x 10 each  Quadruped bird dog (legs only), half foam on back, 3 x 10 each   L ankle DF/PF, +black band, 3x10   Standing L HS curl 3 x 10  L TKE, green band, 3" hold, 3x10    manual therapy techniques: Joint mobilizations, Manual traction, Myofacial release, Soft tissue Mobilization, and Friction Massage for 00 minutes:  L PFJ, knee joint Gr III/II JM     neuromuscular re-education activities to improve: Balance, Coordination, Kinesthetic, Sense, Proprioception, and Posture for 15 minutes:  L SLS with 3 point cone tap, airex mat, 30"x5  BOSU squat, 3" hold, 3 x 10  Fitter board rocking taps A/P, 2x15 with L LE only  Fitter board med/lat weight shifts, 3x10   Single leg RDL, #10, 2 x 10 R/L    therapeutic activities to improve functional performance for 20 minutes:  Eccentric lateral step down bottom stair 3x10 B HRA  +Forward and lateral mini lunge onto BOSU dome for biofeedback x 10 each  Lateral monster walk, knees bent, green loop above knees, x 2 laps in walkway   Forward/retro monster walk, knees bent, green loop above knees x 2 laps  Standing clamshell, +green loop at knees, 3 x 10 each  Standing B heel raise, L heel lower, hands on wall for greater ankle DF, 3x10  Single leg squat 2 x 10 with UHRA for balance  Y balance L x 5 rounds, HRA as needed  Shuttle jump B LEs, 1 bottom band, 3x10 - NP due to time constraints  Shuttle alternating hop, 1 bottom band, 2 x 15 each  Leg " "press, #75, 3x10  Leg press heel raise, 50#, 3x10  Leg press L LE only, 37#, 2x10  Upright bike, lvl 3 resistance, seat height at 7, x 4'    gait training to improve functional mobility and safety for 00 minutes:      Home Exercises and Patient Education Provided  Education provided:   - yes    Home Exercises Provided: yes.  Exercises were reviewed and Freddy was able to demonstrate them prior to the end of the session.  Freddy demonstrated good  understanding of the education provided.     Assessment   Patient progressing well with overall left lower extremity strength, endurance, and muscle control. Does continues with muscle fasciculations with posterior monster walk as well as additional mini lunge onto BOSU both forward and lateral. Recovers well between exercises. May be ready to progress into light plyometric hopping/landing mechanics over next couple sessions.       Patient presents with decreased ROM, muscle strength, flexibility, joint mobility. Increased pain, stiffness, soft tissue restriction. Impaired posture, joint mechanics, balance, gait pattern.     The patient's current task deficits include the following: limitation in ADL, self care, social/recreational tasks.     Patient prognosis: good  Rehab potential: good    Patient will benefit from skilled outpatient Physical Therapy to address the deficits stated above and in the chart below, provide patient /family education, to maximize patient's level of independence, and to address functional deficits.    Anticipated Barriers for therapy:  none    Goals:   Short Terms Goals: 3 weeks    Goal  Progress  Date    (1)  Patient will be I with HOME EXERCISE PROGRAM  Initial, Not Met 10/17/2024     (2)  Patient will obtain +2 deg L knee extension ACTIVE RANGE OF MOTION to indicate improved standing tolerance, > 60 minutes   Initial, Not Met  10/17/2024     (3)   Patient will obtain 30" L SLS average of 3 trials to indicate improved safety in negotiating " obstacles in pathway   Initial, Not Met  10/17/2024       Long Term Goals: 6 weeks    Goal  Progress  Date    (1)  Patient will obtain 5/5 L knee ext MMT to indicate improved ability to make changes in direction while walking as required for work in coaching  Initial, Not Met  10/17/2024   (2)   Patient will pivot to make changes in direction with no episodes of instability at L knee to facilitate return to play with his children   Initial, Not Met 10/17/2024    (3)   Patient will negotiate 2 flights of stairs using alternating gait pattern, no HRA, to facilitate return to previous level of function   Initial, Not Met  10/17/2024       Plan     Plan of care Certification: 10/17/2024 to 10/27/2024.    Outpatient Physical Therapy 2 times weekly for 6 weeks to include the following interventions: Cervical/Lumbar Traction, Electrical Stimulation re-eval, dry needling, Gait Training, Iontophoresis (with ), Manual Therapy, Moist Heat/ Ice, Neuromuscular Re-ed, Patient Education, Self Care, Therapeutic Activities, and Therapeutic Exercise.     Physical therapist and physical therapy assistant(s) will met face to face to discuss patient's treatment plan and progress towards established goals. Pt will be seen by a physical therapist minimally every 6th visit or every 30 days.    Linda Mercer, PTA  10/17/2024

## 2024-10-21 ENCOUNTER — CLINICAL SUPPORT (OUTPATIENT)
Dept: REHABILITATION | Facility: HOSPITAL | Age: 34
End: 2024-10-21
Payer: COMMERCIAL

## 2024-10-21 DIAGNOSIS — R26.2 DIFFICULTY WALKING: ICD-10-CM

## 2024-10-21 DIAGNOSIS — S83.512D SPRAIN OF ANTERIOR CRUCIATE LIGAMENT OF LEFT KNEE, SUBSEQUENT ENCOUNTER: Primary | ICD-10-CM

## 2024-10-21 PROCEDURE — 97530 THERAPEUTIC ACTIVITIES: CPT | Mod: PN | Performed by: PHYSICAL THERAPIST

## 2024-10-21 PROCEDURE — 97112 NEUROMUSCULAR REEDUCATION: CPT | Mod: PN | Performed by: PHYSICAL THERAPIST

## 2024-10-21 PROCEDURE — 97110 THERAPEUTIC EXERCISES: CPT | Mod: PN | Performed by: PHYSICAL THERAPIST

## 2024-10-21 NOTE — PROGRESS NOTES
"[facility]   Physical Therapy Progress Note      Name: Freddy Villegas  [facility] Number: [id number]    Therapy Diagnosis:   Encounter Diagnoses   Name Primary?    Sprain of anterior cruciate ligament of left knee, subsequent encounter Yes    Difficulty walking      Physician: Gaetano Polk MD    Physician Orders: PT Eval and Treat   Medical Diagnosis from Referral: S83.512A (ICD-10-CM) - Sprain of anterior cruciate ligament of left knee  Evaluation Date: 8/27/2024  Authorization Period Expiration: 12/31/2024  Plan of Care Expiration: 12/21/2024  Visit # / Visits authorized: 11/ 13  #Visits based on PHYSICAL THERAPY POC: 18-24    Foto  Date  Score      Knee   Lower Score = Greater Disability   #1/3 8/27/2024 70.7   #2/3 10/7/2024 79.9   #3/3       Time in  1:00pm   Time out 1:55pm   Total Appointment Time  55 minutes      Precautions: Standard  Patient discussed MRI with Dr. Polk, who indicated a Gr III ACL tear at L knee.     Subjective     HOME EXERCISE PROGRAM  Reviewed, reports compliance    Response  Has not really challenged his knee in many directions lately   Current functional status  Still avoiding any activity that may cause twisting or pivoting.   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      Pain:      Current 0/10     Location L medial knee      Objective    10/21/2024:  Observation/posture: sits with L LE extended, L gastroc atrophy     Gait: WNL    Range of Motion:   Knee Right  Left    Flexion 145 146   Extension +2 +1     Lower Extremity Strength       Knee           Right          Left     Extension: 5/5, 74.6 4/5, 57.9    Flexion: 4+/5, 58 4+/5, 34.9    Hip  Right  Left    Abduction  4+/5 4+/5     Function/balance:    Right  Left    Single leg stance  - 30", 30", 30" 20", 13", 30" L>R pes planus in SLS  " "  Squat tba  - -     Joint Mobility:    Left    PFJ  Impaired    Knee joint  Impaired      Edema:    Joint line in cm 10 cm distal to tibial tuberosity in cm 5 cm above in cm   Right 38.0 36.5 40.0   Left  37.5 36.4 39.0     Treatment   + indicates new exercise   Bold indicates completed exercise    therapeutic exercises to develop strength, endurance, ROM, flexibility, posture, and core stabilization for 10 minutes:  L SLR, flex #3, abd #3, ext #3, add #3, 2x10   R/L single leg bridge, half foam under feet, 3 x 10 each  Quadruped bird dog (legs only), half foam on back, 3 x 10 each   L ankle DF/PF, +black band, 3x10   Standing L HS curl 3 x 10  L TKE, green band, 3" hold, 3x10  Initiate next visit - standing L hamstring curl, against resistance loop (based on [facility] strength measures)     manual therapy techniques: Joint mobilizations, Manual traction, Myofacial release, Soft tissue Mobilization, and Friction Massage for 00 minutes:  L PFJ, knee joint Gr III/II JM     neuromuscular re-education activities to improve: Balance, Coordination, Kinesthetic, Sense, Proprioception, and Posture for 20 minutes:  R/L SLS with 3 point cone tap, airex mat, 30"x5  BOSU squat, 3" hold, 3 x 10  Fitter board rocking taps A/P, 2x15 with L LE only  Fitter board med/lat weight shifts, 3x10   Single leg RDL, #10, 2 x 10 R/L    therapeutic activities to improve functional performance for 25 minutes:  Eccentric lateral step down bottom stair 3x10 B HRA  Forward and lateral mini lunge onto BOSU dome for biofeedback x 10 each  Lateral monster walk, knees bent, green loop at ankles x 2 laps in walkway   Forward/retro monster walk, knees bent, green loop at ankles x 2 laps each  Standing clamshell, green loop at knees, 3 x 10 each  Standing B heel raise, L heel lower, hands on wall for greater ankle DF, 3x10  Single leg squat 2 x 10 with UHRA for balance  Y balance L x 5 rounds, HRA as needed  Shuttle jump B LEs, 1 bottom band, 3x10 - NP due " "to time constraints  Shuttle alternating hop, 2 bottom bands, 2 x 15 each  Leg press, #75, 3x10  Leg press heel raise, 50#, 3x10  Leg press L LE only, 37#, 2x10  Upright bike, lvl 3 resistance, seat height at 7, x 4'    gait training to improve functional mobility and safety for 00 minutes:      Home Exercises and Patient Education Provided  Education provided:   - yes    Home Exercises Provided: yes.  Exercises were reviewed and Freddy was able to demonstrate them prior to the end of the session.  Freddy demonstrated good  understanding of the education provided.     Assessment   Patient presents with significant difference in strength for LE flexion and extension based on updated measures using HHD today. He is showing gradual improvement in muscle endurance and strength during closed chain progression of exercises and is able to complete initial medial/lateral movements with no provocation of symptoms.      Patient presents with decreased ROM, muscle strength, flexibility, joint mobility. Increased pain, stiffness, soft tissue restriction. Impaired posture, joint mechanics, balance, gait pattern.     The patient's current task deficits include the following: limitation in ADL, self care, social/recreational tasks.     Patient prognosis: good  Rehab potential: good    Patient will benefit from skilled outpatient Physical Therapy to address the deficits stated above and in the chart below, provide patient /family education, to maximize patient's level of independence, and to address functional deficits.    Anticipated Barriers for therapy:  none    Goals:   Short Terms Goals: 3 weeks    Goal  Progress  Date    (1)  Patient will be I with HOME EXERCISE PROGRAM  Progressing, Not Met 10/21/2024     (2)  Patient will obtain +2 deg L knee extension ACTIVE RANGE OF MOTION to indicate improved standing tolerance, > 60 minutes   Met  10/21/2024     (3)   Patient will obtain 30" L SLS average of 3 trials to indicate " improved safety in negotiating obstacles in pathway  Progressing, Not Met  10/21/2024       Long Term Goals: 6 weeks    Goal  Progress  Date    (1)  Patient will obtain 5/5 L knee ext MMT to indicate improved ability to make changes in direction 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    Outpatient Physical Therapy 2 times weekly for 3 -7 weeks to include the following interventions: Cervical/Lumbar Traction, Electrical Stimulation re-eval, dry needling, Gait Training, Iontophoresis (with ), Manual Therapy, Moist Heat/ Ice, Neuromuscular Re-ed, Patient Education, Self Care, Therapeutic Activities, and Therapeutic Exercise.     Physical therapist and physical therapy assistant(s) will met face to face to discuss patient's treatment plan and progress towards established goals. Pt will be seen by a physical therapist minimally every 6th visit or every 30 days.    Stacie Diaz, PT  10/21/2024      I CERTIFY THE NEED FOR THESE SERVICES FURNISHED UNDER THIS PLAN OF TREATMENT AND WHILE UNDER MY CARE     Physician's comments:           Physician's Signature: ___________________________________________________

## 2024-10-21 NOTE — PLAN OF CARE
"[facility]   Physical Therapy Progress Note      Name: Freddy Villegas  [facility] Number: [id number]    Therapy Diagnosis:   Encounter Diagnoses   Name Primary?    Sprain of anterior cruciate ligament of left knee, subsequent encounter Yes    Difficulty walking      Physician: Gaetano Polk MD    Physician Orders: PT Eval and Treat   Medical Diagnosis from Referral: S83.512A (ICD-10-CM) - Sprain of anterior cruciate ligament of left knee  Evaluation Date: 8/27/2024  Authorization Period Expiration: 12/31/2024  Plan of Care Expiration: 12/21/2024  Visit # / Visits authorized: 11/ 13  #Visits based on PHYSICAL THERAPY POC: 18-24    Foto  Date  Score      Knee   Lower Score = Greater Disability   #1/3 8/27/2024 70.7   #2/3 10/7/2024 79.9   #3/3       Time in  1:00pm   Time out 1:55pm   Total Appointment Time  55 minutes      Precautions: Standard  Patient discussed MRI with Dr. Polk, who indicated a Gr III ACL tear at L knee.     Subjective     HOME EXERCISE PROGRAM  Reviewed, reports compliance    Response  Has not really challenged his knee in many directions lately   Current functional status  Still avoiding any activity that may cause twisting or pivoting.   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      Pain:      Current 0/10     Location L medial knee      Objective    10/21/2024:  Observation/posture: sits with L LE extended, L gastroc atrophy     Gait: WNL    Range of Motion:   Knee Right  Left    Flexion 145 146   Extension +2 +1     Lower Extremity Strength       Knee           Right          Left     Extension: 5/5, 74.6 4/5, 57.9    Flexion: 4+/5, 58 4+/5, 34.9    Hip  Right  Left    Abduction  4+/5 4+/5     Function/balance:    Right  Left    Single leg stance  - 30", 30", 30" 20", 13", 30" L>R pes planus in SLS  " "  Squat tba  - -     Joint Mobility:    Left    PFJ  Impaired    Knee joint  Impaired      Edema:    Joint line in cm 10 cm distal to tibial tuberosity in cm 5 cm above in cm   Right 38.0 36.5 40.0   Left  37.5 36.4 39.0     Treatment   + indicates new exercise   Bold indicates completed exercise    therapeutic exercises to develop strength, endurance, ROM, flexibility, posture, and core stabilization for 10 minutes:  L SLR, flex #3, abd #3, ext #3, add #3, 2x10   R/L single leg bridge, half foam under feet, 3 x 10 each  Quadruped bird dog (legs only), half foam on back, 3 x 10 each   L ankle DF/PF, +black band, 3x10   Standing L HS curl 3 x 10  L TKE, green band, 3" hold, 3x10  Initiate next visit - standing L hamstring curl, against resistance loop (based on [facility] strength measures)     manual therapy techniques: Joint mobilizations, Manual traction, Myofacial release, Soft tissue Mobilization, and Friction Massage for 00 minutes:  L PFJ, knee joint Gr III/II JM     neuromuscular re-education activities to improve: Balance, Coordination, Kinesthetic, Sense, Proprioception, and Posture for 20 minutes:  R/L SLS with 3 point cone tap, airex mat, 30"x5  BOSU squat, 3" hold, 3 x 10  Fitter board rocking taps A/P, 2x15 with L LE only  Fitter board med/lat weight shifts, 3x10   Single leg RDL, #10, 2 x 10 R/L    therapeutic activities to improve functional performance for 25 minutes:  Eccentric lateral step down bottom stair 3x10 B HRA  Forward and lateral mini lunge onto BOSU dome for biofeedback x 10 each  Lateral monster walk, knees bent, green loop at ankles x 2 laps in walkway   Forward/retro monster walk, knees bent, green loop at ankles x 2 laps each  Standing clamshell, green loop at knees, 3 x 10 each  Standing B heel raise, L heel lower, hands on wall for greater ankle DF, 3x10  Single leg squat 2 x 10 with UHRA for balance  Y balance L x 5 rounds, HRA as needed  Shuttle jump B LEs, 1 bottom band, 3x10 - NP due " "to time constraints  Shuttle alternating hop, 2 bottom bands, 2 x 15 each  Leg press, #75, 3x10  Leg press heel raise, 50#, 3x10  Leg press L LE only, 37#, 2x10  Upright bike, lvl 3 resistance, seat height at 7, x 4'    gait training to improve functional mobility and safety for 00 minutes:      Home Exercises and Patient Education Provided  Education provided:   - yes    Home Exercises Provided: yes.  Exercises were reviewed and Freddy was able to demonstrate them prior to the end of the session.  Freddy demonstrated good  understanding of the education provided.     Assessment   Patient presents with significant difference in strength for LE flexion and extension based on updated measures using HHD today. He is showing gradual improvement in muscle endurance and strength during closed chain progression of exercises and is able to complete initial medial/lateral movements with no provocation of symptoms.      Patient presents with decreased ROM, muscle strength, flexibility, joint mobility. Increased pain, stiffness, soft tissue restriction. Impaired posture, joint mechanics, balance, gait pattern.     The patient's current task deficits include the following: limitation in ADL, self care, social/recreational tasks.     Patient prognosis: good  Rehab potential: good    Patient will benefit from skilled outpatient Physical Therapy to address the deficits stated above and in the chart below, provide patient /family education, to maximize patient's level of independence, and to address functional deficits.    Anticipated Barriers for therapy: none    Goals:   Short Terms Goals: 3 weeks    Goal  Progress  Date    (1)  Patient will be I with HOME EXERCISE PROGRAM  Progressing, Not Met 10/21/2024     (2)  Patient will obtain +2 deg L knee extension ACTIVE RANGE OF MOTION to indicate improved standing tolerance, > 60 minutes   Met  10/21/2024     (3)   Patient will obtain 30" L SLS average of 3 trials to indicate " improved safety in negotiating obstacles in pathway  Progressing, Not Met  10/21/2024       Long Term Goals: 6 weeks    Goal  Progress  Date    (1)  Patient will obtain 5/5 L knee ext MMT to indicate improved ability to make changes in direction 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    Outpatient Physical Therapy 2 times weekly for 3 -7 weeks to include the following interventions: Cervical/Lumbar Traction, Electrical Stimulation re-eval, dry needling, Gait Training, Iontophoresis (with ), Manual Therapy, Moist Heat/ Ice, Neuromuscular Re-ed, Patient Education, Self Care, Therapeutic Activities, and Therapeutic Exercise.     Physical therapist and physical therapy assistant(s) will met face to face to discuss patient's treatment plan and progress towards established goals. Pt will be seen by a physical therapist minimally every 6th visit or every 30 days.    Stacie Diaz, PT  10/21/2024      I CERTIFY THE NEED FOR THESE SERVICES FURNISHED UNDER THIS PLAN OF TREATMENT AND WHILE UNDER MY CARE     Physician's comments:           Physician's Signature: ___________________________________________________

## 2024-11-04 ENCOUNTER — CLINICAL SUPPORT (OUTPATIENT)
Dept: REHABILITATION | Facility: HOSPITAL | Age: 34
End: 2024-11-04
Payer: COMMERCIAL

## 2024-11-04 DIAGNOSIS — S83.512D SPRAIN OF ANTERIOR CRUCIATE LIGAMENT OF LEFT KNEE, SUBSEQUENT ENCOUNTER: Primary | ICD-10-CM

## 2024-11-04 DIAGNOSIS — R26.2 DIFFICULTY WALKING: ICD-10-CM

## 2024-11-04 PROCEDURE — 97112 NEUROMUSCULAR REEDUCATION: CPT | Mod: PN | Performed by: PHYSICAL THERAPIST

## 2024-11-04 PROCEDURE — 97110 THERAPEUTIC EXERCISES: CPT | Mod: PN | Performed by: PHYSICAL THERAPIST

## 2024-11-04 PROCEDURE — 97530 THERAPEUTIC ACTIVITIES: CPT | Mod: PN | Performed by: PHYSICAL THERAPIST

## 2024-11-08 ENCOUNTER — CLINICAL SUPPORT (OUTPATIENT)
Dept: REHABILITATION | Facility: HOSPITAL | Age: 34
End: 2024-11-08
Payer: COMMERCIAL

## 2024-11-08 DIAGNOSIS — R26.2 DIFFICULTY WALKING: ICD-10-CM

## 2024-11-08 DIAGNOSIS — S83.512D SPRAIN OF ANTERIOR CRUCIATE LIGAMENT OF LEFT KNEE, SUBSEQUENT ENCOUNTER: Primary | ICD-10-CM

## 2024-11-08 PROCEDURE — 97140 MANUAL THERAPY 1/> REGIONS: CPT | Mod: PN | Performed by: PHYSICAL THERAPIST

## 2024-11-08 PROCEDURE — 97530 THERAPEUTIC ACTIVITIES: CPT | Mod: PN | Performed by: PHYSICAL THERAPIST

## 2024-11-08 PROCEDURE — 97112 NEUROMUSCULAR REEDUCATION: CPT | Mod: PN | Performed by: PHYSICAL THERAPIST

## 2024-11-08 NOTE — PROGRESS NOTES
"[facility]   Physical Therapy Treatment Note      Name: Freddy Villegas  [facility] Number: [id number]    Therapy Diagnosis:   Encounter Diagnoses   Name Primary?    Sprain of anterior cruciate ligament of left knee, subsequent encounter Yes    Difficulty walking      Physician: Gaetano Polk MD    Physician Orders: PT Eval and Treat   Medical Diagnosis from Referral: S83.512A (ICD-10-CM) - Sprain of anterior cruciate ligament of left knee  Evaluation Date: 8/27/2024  Authorization Period Expiration: 12/31/2024  Plan of Care Expiration: 12/21/2024  Visit # / Visits authorized: 13/ 24  #Visits based on PHYSICAL THERAPY POC: 18-24    Foto  Date  Score      Knee   Lower Score = Greater Disability   #1/3 8/27/2024 70.7   #2/3 10/7/2024 79.9   #3/3 11/8/2024 84.6     Time in  1:08pm   Time out 2:17pm   Total Appointment Time  69 minutes      Precautions: Standard  Patient discussed MRI with Dr. Polk, who indicated a Gr III ACL tear at L knee.     Subjective     HOME EXERCISE PROGRAM  Reviewed, reports compliance    Response  No status change with knee lately   Current functional status  Still avoiding any activity that may cause twisting or pivoting.   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      Pain:      Current 0/10     Location L medial knee      Objective    10/21/2024:  Observation/posture: sits with L LE extended, L gastroc atrophy     Gait: WNL    Range of Motion:   Knee Right  Left    Flexion 145 146   Extension +2 +1     Lower Extremity Strength       Knee           Right          Left     Extension: 5/5, 74.6 4/5, 57.9    Flexion: 4+/5, 58 4+/5, 34.9    Hip  Right  Left    Abduction  4+/5 4+/5     Function/balance:    Right  Left    Single leg stance  - 30", 30", 30" 20", 13", 30" L>R pes planus in SLS    Squat tba  - " "-     Joint Mobility:    Left    PFJ  Impaired    Knee joint  Impaired      Edema:    Joint line in cm 10 cm distal to tibial tuberosity in cm 5 cm above in cm   Right 38.0 36.5 40.0   Left  37.5 36.4 39.0     Treatment   + indicates new exercise   Bold indicates completed exercise    therapeutic exercises to develop strength, endurance, ROM, flexibility, posture, and core stabilization for 00 minutes:  L SLR, flex #3, abd #3, ext #3, add #3, 2x10   R/L single leg bridge, half foam under feet, 3 x 10 each  Quadruped bird dog, half foam on back, 3 x 10 each   L TKE, green band, 3" hold, 3x10  R/L hamstring curl in standing, black band, 3x10  R/L hamstring stretch, 30"x 3 at stairs    manual therapy techniques: Joint mobilizations, Manual traction, Myofacial release, Soft tissue Mobilization, and Friction Massage for 20 minutes:  L PFJ, knee joint Gr III/II JM   +Application of TDN: Pt educated on benefits and potential side effects of dry needling. Educated pt on benefits, precautions, side effects following TDN. Educated pt to use heat following treatment sessions if pt is experiencing pain or soreness. Pt verbalized good understanding of education.  Pt signed written consent to dry needling. Pt gave verbal consent for DN  Pt received dry needling to the below listed structures using 30 needles: lateral eye of the L knee     neuromuscular re-education activities to improve: Balance, Coordination, Kinesthetic, Sense, Proprioception, and Posture for 26 minutes:  R/L SLS with 3 point cone tap, blue air [place], x10 each   BOSU squat, 3" hold, 3 x 10  Fitter board rocking taps A/P, +x30 with L LE only  Fitter board med/lat weight shifts, 3x10   +BFR applied to proximal Left Thigh   Size of cuff: Medium   LOP:200 mmHg  60%: 120 mmHg  Duration of each inflation: 2 minutes per set   The following exercises were performed with a goal of completing 1x30, 3x15 repetitions: L SLR, R/L LE leading split squat, back LE on black " block, 3x10 each <10 reps   Single leg RDL, #10, 2 x 10 R/L    therapeutic activities to improve functional performance for 23 minutes:  Eccentric lateral step down bottom stair 3x10 B HRA  Forward and lateral mini lunge onto BOSU dome for biofeedback x 10 each  Lateral monster walk, knees bent, green loop at ankles x 2 laps in walkway   Forward/retro monster walk, knees bent, green loop at ankles x 2 laps each  Standing clamshell, green loop at knees, 3 x 10 each  Standing B heel raise, L heel lower, hands on wall for greater ankle DF, 3x10  Single leg squat 2 x 10 with UHRA for balance  Y balance L x 5 rounds, HRA as needed  Shuttle jump B LEs, 1 bottom band, 3x10 - NP due to time constraints  Shuttle alternating hop, 2 bottom bands, 2 x +20 each  Leg press, #75, 3x10  Leg press heel raise, 50#, 3x10  Leg press L LE only, 37#, 2x10  Upright bike, lvl 3 resistance, seat height at 7, x 4'    gait training to improve functional mobility and safety for 00 minutes:      Home Exercises and Patient Education Provided  Education provided:   - yes    Home Exercises Provided: yes.  Exercises were reviewed and Freddy was able to demonstrate them prior to the end of the session.  Freddy demonstrated good  understanding of the education provided.     Assessment   Blood flow restriction implemented today at L thigh with fair tolerance as patient fatigues quickly. He reports L lateral infrapatellar pain with completion of lunges using L LE forward with blood flow restriction. Manual JM and dry needling implemented to address patient's elevated symptoms at L lateral infrapatellar region with improvement; however, pain in this area does not fully return to baseline upon test re -test of closed chain flexion with squat/lunge task.      Patient presents with decreased ROM, muscle strength, flexibility, joint mobility. Increased pain, stiffness, soft tissue restriction. Impaired posture, joint mechanics, balance, gait pattern.  "    The patient's current task deficits include the following: limitation in ADL, self care, social/recreational tasks.     Patient prognosis: good  Rehab potential: good    Patient will benefit from skilled outpatient Physical Therapy to address the deficits stated above and in the chart below, provide patient /family education, to maximize patient's level of independence, and to address functional deficits.    Anticipated Barriers for therapy:  none    Goals:   Short Terms Goals: 3 weeks    Goal  Progress  Date    (1)  Patient will be I with HOME EXERCISE PROGRAM  Progressing, Not Met 11/8/2024     (2)  Patient will obtain +2 deg L knee extension ACTIVE RANGE OF MOTION to indicate improved standing tolerance, > 60 minutes   Met  11/8/2024     (3)   Patient will obtain 30" L SLS average of 3 trials to indicate improved safety in negotiating obstacles in pathway  Progressing, Not Met  11/8/2024       Long Term Goals: 6 weeks    Goal  Progress  Date    (1)  Patient will obtain 5/5 L knee ext MMT to indicate improved ability to make changes in 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    Outpatient Physical Therapy 2 times weekly for 3 -7 weeks to include the following interventions: Cervical/Lumbar Traction, Electrical Stimulation re-eval, dry needling, Gait Training, Iontophoresis (with ), Manual Therapy, Moist Heat/ Ice, Neuromuscular Re-ed, Patient Education, Self Care, Therapeutic Activities, and Therapeutic Exercise.     Physical therapist and physical " therapy assistant(s) will met face to face to discuss patient's treatment plan and progress towards established goals. Pt will be seen by a physical therapist minimally every 6th visit or every 30 days.    Stacie Diaz, PT  11/7/2024

## 2024-11-20 ENCOUNTER — CLINICAL SUPPORT (OUTPATIENT)
Dept: REHABILITATION | Facility: HOSPITAL | Age: 34
End: 2024-11-20
Payer: COMMERCIAL

## 2024-11-20 DIAGNOSIS — R26.2 DIFFICULTY WALKING: ICD-10-CM

## 2024-11-20 DIAGNOSIS — S83.512D SPRAIN OF ANTERIOR CRUCIATE LIGAMENT OF LEFT KNEE, SUBSEQUENT ENCOUNTER: Primary | ICD-10-CM

## 2024-11-20 PROCEDURE — 97112 NEUROMUSCULAR REEDUCATION: CPT | Mod: PN,CQ

## 2024-11-20 NOTE — PROGRESS NOTES
[facility]   Physical Therapy Treatment Note      Name: Freddy Villegas  [facility] Number: [id number]    Therapy Diagnosis:   Encounter Diagnoses   Name Primary?    Sprain of anterior cruciate ligament of left knee, subsequent encounter Yes    Difficulty walking      Physician: Gaetano Polk MD    Physician Orders: PT Eval and Treat   Medical Diagnosis from Referral: S83.512A (ICD-10-CM) - Sprain of anterior cruciate ligament of left knee  Evaluation Date: 8/27/2024  Authorization Period Expiration: 12/31/2024  Plan of Care Expiration: 12/21/2024  Visit # / Visits authorized: 15/ 24  #Visits based on PHYSICAL THERAPY POC: 18-24    Foto  Date  Score      Knee   Lower Score = Greater Disability   #1/3 8/27/2024 70.7   #2/3 10/7/2024 79.9   #3/3 11/8/2024 84.6     Time in   1:45 pm   Time out  2:30 pm   Total Appointment Time   45 minutes     Precautions: Standard  Patient discussed MRI with Dr. Polk, who indicated a Gr III ACL tear at L knee.     Subjective     HOME EXERCISE PROGRAM  Reviewed, reports compliance    Response  Soreness to the anterior knee, took until Monday to feel a little better. Was unable to make it last week due to schedule issues.    Current functional status  Still avoiding any activity that may cause twisting or pivoting.   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      Pain:      Current 0/10     Location L medial knee      Objective    10/21/2024:  Observation/posture: sits with L LE extended, L gastroc atrophy     Gait: WNL    Range of Motion:   Knee Right  Left    Flexion 145 146   Extension +2 +1     Lower Extremity Strength       Knee           Right          Left     Extension: 5/5, 74.6 4/5, 57.9    Flexion: 4+/5, 58 4+/5, 34.9    Hip  Right  Left    Abduction  4+/5 4+/5     Function/balance:     "Right  Left    Single leg stance  - 30", 30", 30" 20", 13", 30" L>R pes planus in SLS    Squat tba  - -     Joint Mobility:    Left    PFJ  Impaired    Knee joint  Impaired      Edema:    Joint line in cm 10 cm distal to tibial tuberosity in cm 5 cm above in cm   Right 38.0 36.5 40.0   Left  37.5 36.4 39.0     Treatment   + indicates new exercise   Bold indicates completed exercise    therapeutic exercises to develop strength, endurance, ROM, flexibility, posture, and core stabilization for 05 minutes:  +Seated HSS in chair 3x30" R/L  +Prone manual quad stretch 3 x30" R/L with clinician  L SLR, flex #3, abd #3, ext #3, add #3, 2x10   R/L single leg bridge, half foam under feet, 3 x 10 each  Quadruped bird dog, half foam on back, 3 x 10 each   L TKE, green band, 3" hold, 3x10  R/L hamstring curl in standing, black band, 3x10  R/L hamstring stretch, 30"x 3 at stairs    manual therapy techniques: Joint mobilizations, Manual traction, Myofacial release, Soft tissue Mobilization, and Friction Massage for 00 minutes:  L PFJ, knee joint Gr III/II JM   Application of TDN: Pt educated on benefits and potential side effects of dry needling. Educated pt on benefits, precautions, side effects following TDN. Educated pt to use heat following treatment sessions if pt is experiencing pain or soreness. Pt verbalized good understanding of education.  Pt signed written consent to dry needling. Pt gave verbal consent for DN  Pt received dry needling to the below listed structures using 30 needles: lateral eye of the L knee     neuromuscular re-education activities to improve: Balance, Coordination, Kinesthetic, Sense, Proprioception, and Posture for 40 minutes:  BFR applied to proximal Left Thigh   Size of cuff: Medium   LOP:190 mmHg  60%: 114 mmHg  Duration of each inflation: 2 minutes per set   The following exercises were performed with a goal of completing 1x30, 3x15 repetitions: L SLR, double leg squat without weight  Fitter " "board, L2, rocking taps A/P, x30 with L LE only  Fitter board, L2, med/lat weight shifts, x 12; attempted to continue but stopped due to right leg muscle shaking   BOSU squat, 3" hold, 3 x 10  Single leg RDL, #10, 2 x 10 R/L  R/L SLS with 3 point cone tap, blue air Teller, x10 stance on L LE only today    therapeutic activities to improve functional performance for 00 minutes:  Eccentric lateral step down bottom stair 3x10 B HRA  Forward and lateral mini lunge onto BOSU dome for biofeedback x 10 each  Lateral monster walk, knees bent, green loop at ankles x 2 laps in walkway - instructed to try at home later if shaking improved  Forward/retro monster walk, knees bent, green loop at ankles x 2 laps each - instructed to try at home later if shaking improved  Standing clamshell, green loop at knees, 3 x 10 each - instructed to perform at home later if shaking improved  Standing B heel raise, L heel lower, hands on wall for greater ankle DF, 3x10  Single leg squat 2 x 10 with UHRA for balance  Y balance L x 5 rounds, HRA as needed  Shuttle jump B LEs, 1 bottom band, 3x10 - NP due to time constraints  Shuttle alternating hop, 2 bottom bands, 2 x +20 each - resume next session as able  Leg press, #75, 3x10 - resume next session as able  Leg press heel raise, 50#, 3x10  Leg press L LE only, 37#, 2x10 - resume next session  Upright bike, lvl 3 resistance, seat height at 7, x 4'    gait training to improve functional mobility and safety for 00 minutes:      Home Exercises and Patient Education Provided  Education provided:   - yes    Home Exercises Provided: yes.  Exercises were reviewed and Freddy was able to demonstrate them prior to the end of the session.  Freddy demonstrated good  understanding of the education provided.     Assessment   Patient able to complete Blood flow restriction again today with modification of SLR and partial squats with good tolerance. No symptom provocation noted until performing lateral " "fitter board rocking and noted muscle shaking/quivering on the right leg. Was not painful but felt the leg would not support him if weight bearing with bent knee. Rested and performed hamstring and quad stretching in attempt to calm shaking symptoms which lessened but did not stop. Modified remainder of session to maintain safety and stability of the R LE. No flare of symptoms to the left knee with all exercises.      Patient presents with decreased ROM, muscle strength, flexibility, joint mobility. Increased pain, stiffness, soft tissue restriction. Impaired posture, joint mechanics, balance, gait pattern.     The patient's current task deficits include the following: limitation in ADL, self care, social/recreational tasks.     Patient prognosis: good  Rehab potential: good    Patient will benefit from skilled outpatient Physical Therapy to address the deficits stated above and in the chart below, provide patient /family education, to maximize patient's level of independence, and to address functional deficits.    Anticipated Barriers for therapy:  none    Goals:   Short Terms Goals: 3 weeks    Goal  Progress  Date    (1)  Patient will be I with HOME EXERCISE PROGRAM  Progressing, Not Met 11/20/2024     (2)  Patient will obtain +2 deg L knee extension ACTIVE RANGE OF MOTION to indicate improved standing tolerance, > 60 minutes   Met  11/20/2024     (3)   Patient will obtain 30" L SLS average of 3 trials to indicate improved safety in negotiating obstacles in pathway  Progressing, Not Met  11/20/2024       Long Term Goals: 6 weeks    Goal  Progress  Date    (1)  Patient will obtain 5/5 L knee ext MMT to indicate improved ability to make changes in direction while walking as required for work in coaching  Progressing, Not Met  11/20/2024   (2)   Patient will pivot to make changes in direction with no episodes of instability at L knee to facilitate return to play with his children   Ongoing, Not Met 11/20/2024    (3)  "  Patient will negotiate 2 flights of stairs using alternating gait pattern, no HRA, to facilitate return to previous level of function  Progressing, Not Met  11/20/2024       Plan     Plan of care Certification: 11/20/2024 to 12/21/2024.    Outpatient Physical Therapy 2 times weekly for 3 -7 weeks to include the following interventions: Cervical/Lumbar Traction, Electrical Stimulation re-eval, dry needling, Gait Training, Iontophoresis (with ), Manual Therapy, Moist Heat/ Ice, Neuromuscular Re-ed, Patient Education, Self Care, Therapeutic Activities, and Therapeutic Exercise.     Physical therapist and physical therapy assistant(s) will met face to face to discuss patient's treatment plan and progress towards established goals. Pt will be seen by a physical therapist minimally every 6th visit or every 30 days.    Linda Mercer, PTA  11/20/2024

## 2024-12-01 ENCOUNTER — DOCUMENTATION ONLY (OUTPATIENT)
Dept: REHABILITATION | Facility: HOSPITAL | Age: 34
End: 2024-12-01
Payer: COMMERCIAL

## 2024-12-01 DIAGNOSIS — S83.512D SPRAIN OF ANTERIOR CRUCIATE LIGAMENT OF LEFT KNEE, SUBSEQUENT ENCOUNTER: Primary | ICD-10-CM

## 2024-12-01 DIAGNOSIS — R26.2 DIFFICULTY WALKING: ICD-10-CM

## 2024-12-01 NOTE — PROGRESS NOTES
Outpatient Therapy Compliance Update     Name: Freddy Villegas  Clinic Number: [id number]    Therapy Diagnosis:   Encounter Diagnoses   Name Primary?    Sprain of anterior cruciate ligament of left knee, subsequent encounter Yes    Difficulty walking      Physician: Gaetano Polk MD     Care Update      Today Freddy Villegas cancelled his Physical Therapy appointment due to ' appointment time no longer works'.     Date of next scheduled appointment: no further PHYSICAL THERAPY scheduled at this time     Stacie iDaz, PT, DPT

## 2024-12-02 ENCOUNTER — OFFICE VISIT (OUTPATIENT)
Dept: ORTHOPEDICS | Facility: CLINIC | Age: 34
End: 2024-12-02
Payer: COMMERCIAL

## 2024-12-02 DIAGNOSIS — M25.562 LEFT KNEE PAIN, UNSPECIFIED CHRONICITY: ICD-10-CM

## 2024-12-02 DIAGNOSIS — S83.512A SPRAIN OF ANTERIOR CRUCIATE LIGAMENT OF LEFT KNEE, INITIAL ENCOUNTER: Primary | ICD-10-CM

## 2024-12-02 DIAGNOSIS — G89.29 CHRONIC PAIN OF LEFT KNEE: ICD-10-CM

## 2024-12-02 DIAGNOSIS — M25.562 CHRONIC PAIN OF LEFT KNEE: ICD-10-CM

## 2024-12-02 PROCEDURE — 99215 OFFICE O/P EST HI 40 MIN: CPT | Mod: 57,S$GLB,, | Performed by: ORTHOPAEDIC SURGERY

## 2024-12-02 NOTE — H&P (VIEW-ONLY)
34 years old left ACL tear.  Been attempting nonoperative care been unsuccessful.  At this point he is interested in going forward with the ACL reconstruction     Exam shows positive Lachman testing no signs infection     MRI shows ACL tear     Assessment: Left knee ACL tear     Plan: We will schedule the patient for left knee ACL reconstruction with allograft, he is aware of the risks and limitations of surgery and still wants to proceed

## 2024-12-03 DIAGNOSIS — S83.519A ACL (ANTERIOR CRUCIATE LIGAMENT) TEAR: ICD-10-CM

## 2024-12-03 DIAGNOSIS — S83.512A RUPTURE OF ANTERIOR CRUCIATE LIGAMENT OF LEFT KNEE, INITIAL ENCOUNTER: Primary | ICD-10-CM

## 2024-12-03 RX ORDER — CEFAZOLIN SODIUM 2 G/50ML
2 SOLUTION INTRAVENOUS
OUTPATIENT
Start: 2024-12-03

## 2024-12-03 RX ORDER — MUPIROCIN 20 MG/G
OINTMENT TOPICAL
OUTPATIENT
Start: 2024-12-03

## 2024-12-09 DIAGNOSIS — S83.512A RUPTURE OF ANTERIOR CRUCIATE LIGAMENT OF LEFT KNEE, INITIAL ENCOUNTER: Primary | ICD-10-CM

## 2024-12-09 DIAGNOSIS — Z98.890 S/P ACL RECONSTRUCTION: ICD-10-CM

## 2024-12-17 ENCOUNTER — PATIENT MESSAGE (OUTPATIENT)
Dept: ORTHOPEDICS | Facility: CLINIC | Age: 34
End: 2024-12-17
Payer: COMMERCIAL

## 2024-12-30 ENCOUNTER — ANESTHESIA EVENT (OUTPATIENT)
Dept: SURGERY | Facility: HOSPITAL | Age: 34
End: 2024-12-30
Payer: COMMERCIAL

## 2024-12-30 DIAGNOSIS — S83.512A RUPTURE OF ANTERIOR CRUCIATE LIGAMENT OF LEFT KNEE, INITIAL ENCOUNTER: ICD-10-CM

## 2024-12-30 DIAGNOSIS — Z98.890 S/P ACL RECONSTRUCTION: Primary | ICD-10-CM

## 2024-12-30 RX ORDER — OXYCODONE AND ACETAMINOPHEN 5; 325 MG/1; MG/1
1 TABLET ORAL
Qty: 42 TABLET | Refills: 0 | Status: SHIPPED | OUTPATIENT
Start: 2024-12-30

## 2024-12-30 RX ORDER — CEPHALEXIN 500 MG/1
500 CAPSULE ORAL 4 TIMES DAILY
Qty: 20 CAPSULE | Refills: 0 | Status: SHIPPED | OUTPATIENT
Start: 2024-12-30

## 2024-12-31 ENCOUNTER — HOSPITAL ENCOUNTER (OUTPATIENT)
Facility: HOSPITAL | Age: 34
Discharge: HOME | End: 2024-12-31
Attending: ORTHOPAEDIC SURGERY | Admitting: ORTHOPAEDIC SURGERY
Payer: COMMERCIAL

## 2024-12-31 ENCOUNTER — ANESTHESIA (OUTPATIENT)
Dept: SURGERY | Facility: HOSPITAL | Age: 34
End: 2024-12-31
Payer: COMMERCIAL

## 2024-12-31 DIAGNOSIS — S83.519A ACL (ANTERIOR CRUCIATE LIGAMENT) TEAR: ICD-10-CM

## 2024-12-31 DIAGNOSIS — S83.512A RUPTURE OF ANTERIOR CRUCIATE LIGAMENT OF LEFT KNEE, INITIAL ENCOUNTER: ICD-10-CM

## 2024-12-31 PROCEDURE — 71000016 HC POSTOP RECOV ADDL HR: Mod: PO | Performed by: ORTHOPAEDIC SURGERY

## 2024-12-31 PROCEDURE — 27201423 OPTIME MED/SURG SUP & DEVICES STERILE SUPPLY: Mod: PO | Performed by: ORTHOPAEDIC SURGERY

## 2024-12-31 PROCEDURE — 36000710: Mod: PO | Performed by: ORTHOPAEDIC SURGERY

## 2024-12-31 PROCEDURE — 64447 NJX AA&/STRD FEMORAL NRV IMG: CPT | Mod: PO | Performed by: ANESTHESIOLOGY

## 2024-12-31 PROCEDURE — 29888 ARTHRS AID ACL RPR/AGMNTJ: CPT | Mod: LT,,, | Performed by: ORTHOPAEDIC SURGERY

## 2024-12-31 PROCEDURE — 63600175 PHARM REV CODE 636 W HCPCS: Mod: JZ,JG,PO | Performed by: ANESTHESIOLOGY

## 2024-12-31 PROCEDURE — 37000008 HC ANESTHESIA 1ST 15 MINUTES: Mod: PO | Performed by: ORTHOPAEDIC SURGERY

## 2024-12-31 PROCEDURE — 27200651 HC AIRWAY, LMA: Mod: PO | Performed by: ANESTHESIOLOGY

## 2024-12-31 PROCEDURE — 71000015 HC POSTOP RECOV 1ST HR: Mod: PO | Performed by: ORTHOPAEDIC SURGERY

## 2024-12-31 PROCEDURE — 63600175 PHARM REV CODE 636 W HCPCS: Mod: PO | Performed by: ANESTHESIOLOGY

## 2024-12-31 PROCEDURE — 25000003 PHARM REV CODE 250: Mod: PO | Performed by: NURSE ANESTHETIST, CERTIFIED REGISTERED

## 2024-12-31 PROCEDURE — 71000033 HC RECOVERY, INTIAL HOUR: Mod: PO | Performed by: ORTHOPAEDIC SURGERY

## 2024-12-31 PROCEDURE — 36000711: Mod: PO | Performed by: ORTHOPAEDIC SURGERY

## 2024-12-31 PROCEDURE — 63600175 PHARM REV CODE 636 W HCPCS: Mod: PO | Performed by: ORTHOPAEDIC SURGERY

## 2024-12-31 PROCEDURE — 63600175 PHARM REV CODE 636 W HCPCS: Mod: PO | Performed by: NURSE ANESTHETIST, CERTIFIED REGISTERED

## 2024-12-31 PROCEDURE — C1713 ANCHOR/SCREW BN/BN,TIS/BN: HCPCS | Mod: PO | Performed by: ORTHOPAEDIC SURGERY

## 2024-12-31 PROCEDURE — C1762 CONN TISS, HUMAN(INC FASCIA): HCPCS | Mod: PO | Performed by: ORTHOPAEDIC SURGERY

## 2024-12-31 PROCEDURE — 37000009 HC ANESTHESIA EA ADD 15 MINS: Mod: PO | Performed by: ORTHOPAEDIC SURGERY

## 2024-12-31 PROCEDURE — 25000003 PHARM REV CODE 250: Mod: PO | Performed by: ORTHOPAEDIC SURGERY

## 2024-12-31 PROCEDURE — C9290 INJ, BUPIVACAINE LIPOSOME: HCPCS | Mod: PO | Performed by: ANESTHESIOLOGY

## 2024-12-31 DEVICE — IMPLANTABLE DEVICE: Type: IMPLANTABLE DEVICE | Site: KNEE | Status: FUNCTIONAL

## 2024-12-31 RX ORDER — GLUCAGON 1 MG
1 KIT INJECTION
Status: DISCONTINUED | OUTPATIENT
Start: 2024-12-31 | End: 2024-12-31 | Stop reason: HOSPADM

## 2024-12-31 RX ORDER — ONDANSETRON HYDROCHLORIDE 2 MG/ML
INJECTION, SOLUTION INTRAVENOUS
Status: DISCONTINUED | OUTPATIENT
Start: 2024-12-31 | End: 2024-12-31

## 2024-12-31 RX ORDER — BUPIVACAINE HYDROCHLORIDE 5 MG/ML
INJECTION, SOLUTION EPIDURAL; INTRACAUDAL
Status: COMPLETED | OUTPATIENT
Start: 2024-12-31 | End: 2024-12-31

## 2024-12-31 RX ORDER — PROPOFOL 10 MG/ML
VIAL (ML) INTRAVENOUS
Status: DISCONTINUED | OUTPATIENT
Start: 2024-12-31 | End: 2024-12-31

## 2024-12-31 RX ORDER — BUPIVACAINE 13.3 MG/ML
INJECTION, SUSPENSION, LIPOSOMAL INFILTRATION
Status: COMPLETED | OUTPATIENT
Start: 2024-12-31 | End: 2024-12-31

## 2024-12-31 RX ORDER — DIPHENHYDRAMINE HYDROCHLORIDE 50 MG/ML
12.5 INJECTION INTRAMUSCULAR; INTRAVENOUS EVERY 6 HOURS PRN
Status: DISCONTINUED | OUTPATIENT
Start: 2024-12-31 | End: 2024-12-31 | Stop reason: HOSPADM

## 2024-12-31 RX ORDER — ACETAMINOPHEN 10 MG/ML
INJECTION, SOLUTION INTRAVENOUS
Status: DISCONTINUED | OUTPATIENT
Start: 2024-12-31 | End: 2024-12-31

## 2024-12-31 RX ORDER — KETOROLAC TROMETHAMINE 30 MG/ML
INJECTION, SOLUTION INTRAMUSCULAR; INTRAVENOUS
Status: DISCONTINUED | OUTPATIENT
Start: 2024-12-31 | End: 2024-12-31

## 2024-12-31 RX ORDER — CEFAZOLIN SODIUM 1 G/3ML
2 INJECTION, POWDER, FOR SOLUTION INTRAMUSCULAR; INTRAVENOUS
Status: DISCONTINUED | OUTPATIENT
Start: 2024-12-31 | End: 2024-12-31 | Stop reason: HOSPADM

## 2024-12-31 RX ORDER — HYDROMORPHONE HYDROCHLORIDE 2 MG/ML
0.2 INJECTION, SOLUTION INTRAMUSCULAR; INTRAVENOUS; SUBCUTANEOUS EVERY 5 MIN PRN
Status: DISCONTINUED | OUTPATIENT
Start: 2024-12-31 | End: 2024-12-31 | Stop reason: HOSPADM

## 2024-12-31 RX ORDER — MIDAZOLAM HYDROCHLORIDE 1 MG/ML
.5-4 INJECTION, SOLUTION INTRAMUSCULAR; INTRAVENOUS
Status: DISCONTINUED | OUTPATIENT
Start: 2024-12-31 | End: 2024-12-31 | Stop reason: HOSPADM

## 2024-12-31 RX ORDER — CEFAZOLIN SODIUM 1 G/3ML
INJECTION, POWDER, FOR SOLUTION INTRAMUSCULAR; INTRAVENOUS
Status: DISCONTINUED | OUTPATIENT
Start: 2024-12-31 | End: 2024-12-31

## 2024-12-31 RX ORDER — CEFAZOLIN SODIUM 1 G/3ML
1 INJECTION, POWDER, FOR SOLUTION INTRAMUSCULAR; INTRAVENOUS ONCE
Status: COMPLETED | OUTPATIENT
Start: 2024-12-31 | End: 2024-12-31

## 2024-12-31 RX ORDER — DEXAMETHASONE SODIUM PHOSPHATE 4 MG/ML
INJECTION, SOLUTION INTRA-ARTICULAR; INTRALESIONAL; INTRAMUSCULAR; INTRAVENOUS; SOFT TISSUE
Status: DISCONTINUED | OUTPATIENT
Start: 2024-12-31 | End: 2024-12-31

## 2024-12-31 RX ORDER — FENTANYL CITRATE 50 UG/ML
25-200 INJECTION, SOLUTION INTRAMUSCULAR; INTRAVENOUS
Status: DISCONTINUED | OUTPATIENT
Start: 2024-12-31 | End: 2024-12-31 | Stop reason: HOSPADM

## 2024-12-31 RX ORDER — LIDOCAINE HYDROCHLORIDE 20 MG/ML
INJECTION INTRAVENOUS
Status: DISCONTINUED | OUTPATIENT
Start: 2024-12-31 | End: 2024-12-31

## 2024-12-31 RX ORDER — FENTANYL CITRATE 50 UG/ML
25 INJECTION, SOLUTION INTRAMUSCULAR; INTRAVENOUS EVERY 5 MIN PRN
Status: COMPLETED | OUTPATIENT
Start: 2024-12-31 | End: 2024-12-31

## 2024-12-31 RX ORDER — ONDANSETRON HYDROCHLORIDE 2 MG/ML
4 INJECTION, SOLUTION INTRAVENOUS DAILY PRN
Status: DISCONTINUED | OUTPATIENT
Start: 2024-12-31 | End: 2024-12-31 | Stop reason: HOSPADM

## 2024-12-31 RX ORDER — KETAMINE HCL IN 0.9 % NACL 50 MG/5 ML
SYRINGE (ML) INTRAVENOUS
Status: DISCONTINUED | OUTPATIENT
Start: 2024-12-31 | End: 2024-12-31

## 2024-12-31 RX ORDER — MUPIROCIN 20 MG/G
OINTMENT TOPICAL
Status: DISCONTINUED | OUTPATIENT
Start: 2024-12-31 | End: 2024-12-31 | Stop reason: HOSPADM

## 2024-12-31 RX ORDER — OXYCODONE HYDROCHLORIDE 5 MG/1
5 TABLET ORAL
Status: DISCONTINUED | OUTPATIENT
Start: 2024-12-31 | End: 2024-12-31 | Stop reason: HOSPADM

## 2024-12-31 RX ORDER — SODIUM CHLORIDE, SODIUM LACTATE, POTASSIUM CHLORIDE, CALCIUM CHLORIDE 600; 310; 30; 20 MG/100ML; MG/100ML; MG/100ML; MG/100ML
INJECTION, SOLUTION INTRAVENOUS CONTINUOUS
Status: DISCONTINUED | OUTPATIENT
Start: 2024-12-31 | End: 2024-12-31 | Stop reason: HOSPADM

## 2024-12-31 RX ORDER — LIDOCAINE HYDROCHLORIDE 10 MG/ML
1 INJECTION, SOLUTION EPIDURAL; INFILTRATION; INTRACAUDAL; PERINEURAL ONCE
Status: DISCONTINUED | OUTPATIENT
Start: 2024-12-31 | End: 2024-12-31 | Stop reason: HOSPADM

## 2024-12-31 RX ORDER — MEPERIDINE HYDROCHLORIDE 50 MG/ML
12.5 INJECTION INTRAMUSCULAR; INTRAVENOUS; SUBCUTANEOUS ONCE AS NEEDED
Status: DISCONTINUED | OUTPATIENT
Start: 2024-12-31 | End: 2024-12-31 | Stop reason: HOSPADM

## 2024-12-31 RX ADMIN — DEXAMETHASONE SODIUM PHOSPHATE 4 MG: 4 INJECTION, SOLUTION INTRAMUSCULAR; INTRAVENOUS at 08:12

## 2024-12-31 RX ADMIN — FENTANYL CITRATE 25 MCG: 50 INJECTION INTRAMUSCULAR; INTRAVENOUS at 10:12

## 2024-12-31 RX ADMIN — KETOROLAC TROMETHAMINE 30 MG: 30 INJECTION, SOLUTION INTRAMUSCULAR at 09:12

## 2024-12-31 RX ADMIN — LIDOCAINE HYDROCHLORIDE 100 MG: 20 INJECTION INTRAVENOUS at 08:12

## 2024-12-31 RX ADMIN — CEFAZOLIN 1 G: 330 INJECTION, POWDER, FOR SOLUTION INTRAMUSCULAR; INTRAVENOUS at 10:12

## 2024-12-31 RX ADMIN — ACETAMINOPHEN 1000 MG: 10 INJECTION INTRAVENOUS at 08:12

## 2024-12-31 RX ADMIN — ONDANSETRON 4 MG: 2 INJECTION, SOLUTION INTRAMUSCULAR; INTRAVENOUS at 08:12

## 2024-12-31 RX ADMIN — CEFAZOLIN 2 G: 330 INJECTION, POWDER, FOR SOLUTION INTRAMUSCULAR; INTRAVENOUS at 08:12

## 2024-12-31 RX ADMIN — BUPIVACAINE 20 ML: 13.3 INJECTION, SUSPENSION, LIPOSOMAL INFILTRATION at 08:12

## 2024-12-31 RX ADMIN — PROPOFOL 200 MG: 10 INJECTION, EMULSION INTRAVENOUS at 08:12

## 2024-12-31 RX ADMIN — FENTANYL CITRATE 25 MCG: 50 INJECTION INTRAMUSCULAR; INTRAVENOUS at 09:12

## 2024-12-31 RX ADMIN — SODIUM CHLORIDE, POTASSIUM CHLORIDE, SODIUM LACTATE AND CALCIUM CHLORIDE: 600; 310; 30; 20 INJECTION, SOLUTION INTRAVENOUS at 07:12

## 2024-12-31 RX ADMIN — MIDAZOLAM 2 MG: 1 INJECTION INTRAMUSCULAR; INTRAVENOUS at 07:12

## 2024-12-31 RX ADMIN — FENTANYL CITRATE 50 MCG: 50 INJECTION INTRAMUSCULAR; INTRAVENOUS at 07:12

## 2024-12-31 RX ADMIN — MUPIROCIN: 20 OINTMENT TOPICAL at 07:12

## 2024-12-31 RX ADMIN — Medication 20 MG: at 08:12

## 2024-12-31 RX ADMIN — BUPIVACAINE HYDROCHLORIDE 10 ML: 5 INJECTION, SOLUTION EPIDURAL; INTRACAUDAL; PERINEURAL at 08:12

## 2024-12-31 NOTE — PLAN OF CARE
Vital signs stable. Discharge instructions reviewed with patient, and mom.  Crutches given to pt. Questions answered. Verbalized understanding.

## 2024-12-31 NOTE — ANESTHESIA PROCEDURE NOTES
Intubation    Date/Time: 12/31/2024 8:26 AM    Performed by: Becca Zamora CRNA  Authorized by: Gaetano Eastman MD    Intubation:     Induction:  Intravenous    Intubated:  Postinduction    Mask Ventilation:  Easy mask    Attempts:  1    Attempted By:  CRNA    Difficult Airway Encountered?: No      Complications:  None    Airway Device:  Supraglottic airway/LMA    Airway Device Size:  4.0    Style/Cuff Inflation:  Cuffed (inflated to minimal occlusive pressure)    Secured at:  The lips    Placement Verified By:  Capnometry    Complicating Factors:  None    Findings Post-Intubation:  BS equal bilateral and atraumatic/condition of teeth unchanged

## 2024-12-31 NOTE — ANESTHESIA PREPROCEDURE EVALUATION
12/31/2024  Freddy Villegas is a 34 y.o., male.      Pre-op Assessment    I have reviewed the Patient Summary Reports.     I have reviewed the Nursing Notes. I have reviewed the NPO Status.   I have reviewed the Medications.     Review of Systems  Anesthesia Hx:  No problems with previous Anesthesia                Cardiovascular:  Cardiovascular Normal                                              Pulmonary:  Pulmonary Normal                       Neurological:  Neurology Normal                                          Physical Exam  General: Well nourished    Airway:  Mallampati: II   Mouth Opening: Normal  TM Distance: Normal  Neck ROM: Normal ROM        Anesthesia Plan  Type of Anesthesia, risks & benefits discussed:    Anesthesia Type: Gen Supraglottic Airway  Intra-op Monitoring Plan: Standard ASA Monitors  Post Op Pain Control Plan: multimodal analgesia and peripheral nerve block  Induction:  IV  Informed Consent: Informed consent signed with the Patient and all parties understand the risks and agree with anesthesia plan.  All questions answered.   ASA Score: 1    Ready For Surgery From Anesthesia Perspective.     .

## 2024-12-31 NOTE — ANESTHESIA POSTPROCEDURE EVALUATION
Anesthesia Post Evaluation    Patient: Freddy Villegas    Procedure(s) Performed: Procedure(s) (LRB):  RECONSTRUCTION, KNEE, ACL, ARTHROSCOPIC (Left)    Final Anesthesia Type: general      Patient location during evaluation: PACU  Patient participation: Yes- Able to Participate  Level of consciousness: awake  Post-procedure vital signs: reviewed and stable  Pain management: adequate  Airway patency: patent    PONV status at discharge: No PONV  Anesthetic complications: no      Cardiovascular status: blood pressure returned to baseline  Respiratory status: unassisted  Hydration status: euvolemic  Follow-up not needed.              Vitals Value Taken Time   /86 12/31/24 1045   Temp  12/31/24 1152   Pulse 71 12/31/24 1045   Resp 15 12/31/24 1045   SpO2 100 % 12/31/24 1045         Event Time   Out of Recovery 09:50:00         Pain/Ted Score: Pain Rating Prior to Med Admin: 5 (12/31/2024 10:38 AM)  Pain Rating Post Med Admin: 3 (12/31/2024 10:46 AM)  Ted Score: 10 (12/31/2024 10:46 AM)

## 2024-12-31 NOTE — DISCHARGE SUMMARY
"Discharge Note  Short Stay      SUMMARY     Admit Date: 12/31/2024    Attending Physician: Gaetano Polk MD     Discharge Physician: Gaetano Polk MD    Discharge Date: 12/31/2024 9:28 AM    Final Diagnosis: Rupture of anterior cruciate ligament of left knee, initial encounter [S83.512A]    Hospital Course: Patient tolerated procedure well.     Disposition: Home or Self Care    Patient Instructions:   Current Discharge Medication List        CONTINUE these medications which have NOT CHANGED    Details   cephALEXin (KEFLEX) 500 MG capsule Take 1 capsule (500 mg total) by mouth 4 (four) times daily.  Qty: 20 capsule, Refills: 0    Associated Diagnoses: S/P ACL reconstruction; Rupture of anterior cruciate ligament of left knee, initial encounter      oxyCODONE-acetaminophen (PERCOCET) 5-325 mg per tablet Take 1 tablet by mouth every 4 to 6 hours as needed for Pain.  Qty: 42 tablet, Refills: 0    Comments: Quantity prescribed more than 7 day supply? Yes, quantity medically necessary  Associated Diagnoses: S/P ACL reconstruction; Rupture of anterior cruciate ligament of left knee, initial encounter             Discharge Procedure Orders (must include Diet, Follow-up, Activity):   Discharge Procedure Orders (must include Diet, Follow-up, Activity)   CRUTCHES FOR HOME USE     Order Specific Question Answer Comments   Type: Axillary    Height: 5' 10" (1.778 m)    Weight: 83.9 kg (185 lb)    Length of need (1-99 months): 99      Keep surgical extremity elevated     Ice to affected area      Remove dressing in 72 hours   Order Comments: 1st dressing change at physical therapy on Friday     Activity as tolerated     Weight bearing restrictions (specify):   Order Comments: Use crutches and brace for comfort        Follow Up:  Follow up as scheduled.  Resume routine diet.  Activity as tolerated.    No driving day of procedure.        "

## 2024-12-31 NOTE — PLAN OF CARE
Left saphenous single shot block complete per Dr. Eastman with Anesthesia. Exparel band placed to pt's wrist. Pt tolerated well. See Anesthesia record for procedural notes. See flowsheet and MAR for vitals and medications. Monitors in place, alarms audible. VSS. etCO2 monitoring in place. Resp even, unlabored. Skin warm, dry. Pt in NAD. Pt awaiting transport to OR. Family at bedside. Bed in lowest, locked position. Side rails up x2. Call light within reach.

## 2024-12-31 NOTE — ANESTHESIA PROCEDURE NOTES
Peripheral Block    Patient location during procedure: pre-op   Block not for primary anesthetic.  Reason for block: at surgeon's request and post-op pain management   Post-op Pain Location: left knee   Start time: 12/31/2024 8:00 AM  Timeout: 12/31/2024 7:55 AM   End time: 12/31/2024 8:04 AM    Staffing  Authorizing Provider: Gaetano Eastman MD  Performing Provider: Gaetano Eastman MD    Staffing  Performed by: Gaetano Eastman MD  Authorized by: Gaetano Eastman MD    Preanesthetic Checklist  Completed: patient identified, IV checked, site marked, risks and benefits discussed, surgical consent, monitors and equipment checked, pre-op evaluation and timeout performed  Peripheral Block  Patient position: supine  Prep: ChloraPrep  Patient monitoring: heart rate, cardiac monitor, continuous pulse ox, continuous capnometry and frequent blood pressure checks  Block type: adductor canal  Laterality: left  Injection technique: single shot  Needle  Needle type: Stimuplex   Needle gauge: 21 G  Needle length: 4 in  Needle localization: anatomical landmarks and ultrasound guidance   -ultrasound image captured on disc.  Assessment  Injection assessment: negative aspiration, negative parasthesia and local visualized surrounding nerve  Paresthesia pain: none  Heart rate change: no  Slow fractionated injection: yes    Medications:    Medications: BUPivacaine liposome (PF) 1.3 % (13.3 mg/mL) suspension - Injection   20 mL - 12/31/2024 8:00:00 AM  bupivacaine (pf) (MARCAINE) injection 0.5% - Perineural   10 mL - 12/31/2024 8:00:00 AM    Additional Notes  VSS.  [facility] RN monitoring vitals throughout procedure.  Patient tolerated procedure well.

## 2024-12-31 NOTE — TRANSFER OF CARE
"Anesthesia Transfer of Care Note    Patient: Freddy Villegas    Procedure(s) Performed: Procedure(s) (LRB):  RECONSTRUCTION, KNEE, ACL, ARTHROSCOPIC (Left)    Patient location: PACU    Anesthesia Type: general    Transport from OR: Transported from OR on room air with adequate spontaneous ventilation    Post pain: adequate analgesia    Post assessment: no apparent anesthetic complications and tolerated procedure well    Post vital signs: stable    Level of consciousness: responds to stimulation    Nausea/Vomiting: no nausea/vomiting    Complications: none    Transfer of care protocol was followed      Last vitals: Visit Vitals  /73 (BP Location: Left arm, Patient Position: Lying)   Pulse 85   Temp 36.5 °C (97.7 °F) (Skin)   Resp 12   Ht 5' 10" (1.778 m)   Wt 83.9 kg (185 lb)   SpO2 99%   BMI 26.54 kg/m²     "

## 2024-12-31 NOTE — OP NOTE
[facility]  Operative Note    SUMMARY     Date of Procedure: 12/31/2024     Pre-Operative Diagnosis: Rupture of anterior cruciate ligament of left knee, initial encounter [S83.512A]    Post-Operative Diagnosis: Post-Op Diagnosis Codes:     * Rupture of anterior cruciate ligament of left knee, initial encounter [S83.512A]    Procedure: Procedure(s) (LRB):  RECONSTRUCTION, KNEE, ACL, ARTHROSCOPIC (Left)       Surgeons and Role:     * Gaetano Polk MD - Primary    Indications for procedure:      Procedure in Detail:  After obtaining consent and starting the patient on preoperative IV antibiotics, patient was taken back to the operating room where general anesthesia was performed the anesthesia team.  Pivot shift testing confirmed anterior-posterior instability.  The left lower extremity was prepped and draped normal sterile fashion.  Standard inferior lateral portal was established arthroscope introduced into the knee.  Patellofemoral compartment looked healthy.  Went down the medial compartment localize a medial portal spinal needle established a medial portal inspected medial compartment which showed no meniscal or chondral pathology.  In the notch the ACL was torn from the lateral femoral condyle.  Lateral compartment looked healthy with no meniscal or chondral pathology.  We then took down the ACL stump with a shaver and electrocautery got good visualization of the posterior portion of the notch.  We then used our tibial guide to place our guide pin in the footprint of the ACL in line with the posterior portion of the anterior horn lateral meniscus.  We then overdrilled with a 9-1/2 mm drill bit to create our tibial tunnel we then went ahead and drilled our femoral tunnel inside-out fashion in the 2 o'clock position of this left knee using the flip cutter device up to the lateral femoral cortex diameter of 9-1/2.  Pull-through stitch was placed from the femoral tunnel down across the tibial tunnel.   We then used our 9-1/2 mm soft tissue allograft that was prepared on the back table to a full with a diameter of 9-1/2 mm which was placed over 20 mm closed loop [employer] endo-button.  Whipstitches on the distal ends.  We then able to pass the graft from the tibial tunnel across the knee joint seated into the femoral tunnel flipped the Endobutton on the lateral femoral cortex good tactile feel we placed maximal tension on the graft itself we cycled the knee vigorously we then ensured that there was no impingement of the graft with the knee in full extension and finally we affixed the graft in the tibial tunnel with a 10 x 25 bioabsorbable interference screw with the knee in about 10° of flexion with maximal tension on the graft and a slight posterior drawer force on the proximal tibia.  Lachman testing confirmed anterior-posterior instability had been restored.  Excess graft was trimmed we closed stab incision with 2-0 Vicryl.  Portal sites closed with nylon stitches.  Sterile dressing applied tourniquet deflated this concluded the operative procedure.    Anesthesia: General/Regional    Complications: No    Estimated Blood Loss (EBL): * No values recorded between 12/31/2024  8:44 AM and 12/31/2024  9:22 AM *           Implants:   Implant Name Type Inv. Item Serial No.  Lot No. LRB No. Used Action   Endobutton CL Ultra Fixation Button with 20mm Continuous loop suture     [id number] Left 1 Implanted   [id number] Tendon GRAFT TENDON POSTERIOR TIBIA [id number] [id number]   1 Implanted   BIOSURE HA Screw, [id number]     [id number] Left 1 Implanted       Specimens:   Specimen (24h ago, onward)      None

## 2024-12-31 NOTE — DISCHARGE INSTRUCTIONS
KNEE ARTHROSCOPY/ACL    DO:   Keep leg elevated while at rest until return appointment.   Use crutches at all times, unless otherwise directed by your physician.   Apply ice to knee next 2 days for 20 min intervals.  Remove ace wrap 3 days after surgery. Y(ACL) Physical therapy will change your dressing at your first visit)   Check circulation frequently in toes by pressing down on nail. Nail should turn white then pink. Your extremity should not be bluish gray.     ACL- no weight to surgical leg until block wears off. Then partial weight with brace in place and locked. Wear brace until next doctor appointment.   Follow up with Physical Therapy.      DO NOT:   Do not soak in a tub or pool.   Do not take additional tylenol/acetaminophen while taking narcotic pain medication that contains tylenol/acetaminophen.     CALL PHYSICIAN FOR:   Bleeding or signs of infection (redness, swelling, draining pus or warm to touch).   Fever greater than 101.   Persistent pain not relieved by pain medication.    RETURN APPOINTMENT AS INSTRUCTED  CALL [phone] for doctor.

## 2025-01-02 VITALS
HEIGHT: 70 IN | DIASTOLIC BLOOD PRESSURE: 86 MMHG | HEART RATE: 71 BPM | OXYGEN SATURATION: 100 % | TEMPERATURE: 98 F | WEIGHT: 185 LBS | SYSTOLIC BLOOD PRESSURE: 122 MMHG | BODY MASS INDEX: 26.48 KG/M2 | RESPIRATION RATE: 15 BRPM

## 2025-01-03 ENCOUNTER — CLINICAL SUPPORT (OUTPATIENT)
Dept: REHABILITATION | Facility: HOSPITAL | Age: 35
End: 2025-01-03
Attending: ORTHOPAEDIC SURGERY
Payer: COMMERCIAL

## 2025-01-03 DIAGNOSIS — S83.512D SPRAIN OF ANTERIOR CRUCIATE LIGAMENT OF LEFT KNEE, SUBSEQUENT ENCOUNTER: Primary | ICD-10-CM

## 2025-01-03 DIAGNOSIS — R26.2 DIFFICULTY WALKING: ICD-10-CM

## 2025-01-03 DIAGNOSIS — Z98.890 S/P ACL RECONSTRUCTION: ICD-10-CM

## 2025-01-03 DIAGNOSIS — S83.512A RUPTURE OF ANTERIOR CRUCIATE LIGAMENT OF LEFT KNEE, INITIAL ENCOUNTER: ICD-10-CM

## 2025-01-03 PROCEDURE — 97162 PT EVAL MOD COMPLEX 30 MIN: CPT | Mod: PN | Performed by: PHYSICAL THERAPIST

## 2025-01-03 PROCEDURE — 97530 THERAPEUTIC ACTIVITIES: CPT | Mod: PN | Performed by: PHYSICAL THERAPIST

## 2025-01-03 PROCEDURE — 97112 NEUROMUSCULAR REEDUCATION: CPT | Mod: PN | Performed by: PHYSICAL THERAPIST

## 2025-01-03 PROCEDURE — 97110 THERAPEUTIC EXERCISES: CPT | Mod: PN | Performed by: PHYSICAL THERAPIST

## 2025-01-03 NOTE — PROGRESS NOTES
[facility]   Physical Therapy Initial Evaluation      Name: Freddy Villegas  [facility] Number: [id number]    Therapy Diagnosis:   Encounter Diagnoses   Name Primary?    Sprain of anterior cruciate ligament of left knee, subsequent encounter Yes    Difficulty walking      Physician: Gaetano Polk MD    Physician Orders: PT Eval and Treat   Medical Diagnosis from Referral: S83.512A (ICD-10-CM) - Rupture of anterior cruciate ligament of left knee, initial encounter Z98.890 (ICD-10-CM) - S/P ACL reconstruction  Evaluation Date: 1/3/2025  Authorization Period Expiration: 12/9/2025  Plan of Care Expiration: 3/3/2025  Visit # / Visits authorized: 1/ 1  #Visits based on PHYSICAL THERAPY POC: 26     Foto  Date  Score      Knee  Lower Score = Greater Disability   #1/3 1/3/2025 31.3   #2/3     #3/3       Time in  10:17am   Time out 11:20am   Total Appointment Time (timed & untimed codes) 63 minutes      Precautions: Standard    Subjective     Date of injury: August 2024  History of current condition - patient was playing basketball, landed on the ground and felt his L knee buckle resulting in falling to the ground. He then found out her tore his L ACL.     Prior medical treatment: knee brace and physical therapy     History of falls/MVAs: none     Current functional status  Severe limitation    Previous functional status  No limitation    Patient stated goal Return to previous level of function      Imaging:  L X-ray FINDINGS: No acute fracture or dislocation.  Slight bilateral lateral patellar tilt and patellofemoral joint space narrowing.  Small left joint effusion.     L knee MRI Impression:  1. There appears to be evidence of intermediate grade ACL strain.  2. Subtle intermediate signal in the proximal PCL suggestive of low-grade strain is seen.  3. There is intermediate increased T2 signal in the anterior horn root ligament of the lateral meniscus suggestive of low to intermediate grade  Patient would have to be evaluated for a nebulizer machine; has he had one in the past? Nebulizer is usually prescribed for long-term use with certain pulmonary diagnoses, but not usually for upper respiratory illness. Please clarify. Thank you.   "strain.  Otherwise, the lateral meniscus appears intact.  4. Minimal edema like signal along the posterior distal fibers of the vastus medialis and vastus lateralis muscles may reflect low-grade strains.  5. There is attenuation of the MPFL with subtle increased adjacent T2 signal along its deep fibers near the medial epicondyle insertion which may reflect minimal partial thickness deep fiber tearing.  6. Medial head gastrocnemius tendinopathy is seen with adjacent ganglia.  7. Baker's cyst.  8. Additional findings and details as above.     Patient discussed MRI with Dr. Polk, who indicated a Gr III ACL tear at L knee.     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    Surgical History:   Freddy Villegas  has a past surgical history that includes Clavicle surgery.    Medications:   Freddy has a current medication list which includes the following prescription(s): cephalexin and oxycodone-acetaminophen.    Allergies:   Review of patient's allergies indicates:  No Known Allergies     Objective    Observation/posture: presents wearing brace locked at L knee/leg with B axillary crutches (crutches at a height too low for patient)    Gait: non weight bearing on L LE    Range of Motion:   Knee Right  Left    Flexion 144 tba   Extension 0 -3     Lower Extremity Strength       Knee           Right          Left     Extension: 5/5 tba   Flexion: 4+/5 tba     Function/balance:    Right  Left    Single leg stance  - 30"  tba   Squat tba  - -     Joint Mobility:    Right  Left    PFJ  WNL Impaired    Knee joint   WNL Impaired      Edema:    Joint line in cm 10 cm distal to tibial tuberosity in cm 5 cm above in cm   Right 39.0 36.5 40.0   Left  41.0 " "37.0 41.5     Treatment   Total Treatment time (time-based codes) separate from Evaluation: 48 minutes    + indicates new exercise   Bold indicates completed exercise    therapeutic exercises to develop strength, endurance, ROM, flexibility, posture, and core stabilization for 15 minutes:  Patient education on nature of current condition and PHYSICAL THERAPY POC  Written HOME EXERCISE PROGRAM provided, reviewed, patient demo understanding   Prone knee hang x 4'    manual therapy techniques: Joint mobilizations, Manual traction, Myofacial release, Soft tissue Mobilization, and Friction Massage for 00 minutes:  L PFJ, knee joint     neuromuscular re-education activities to improve: Balance, Coordination, Kinesthetic, Sense, Proprioception, and Posture for 8 minutes:  Quad sets, 5" hold, 3x10     therapeutic activities to improve functional performance for 25 minutes:  Crutch height adjusted to be taller with 4 finger gap between axilla and top of crutch   Post op dressing removal   Middle layer of wound dressing removed (between bandage wrap and gauze pads)   Gauze at lateral incisions replaced, medial incision gauze left in place to not disturb closure and healing of this incision   Bandage wrap replaced in half overlapping pattern going distal lower leg to mid femur   Additional gauze and elastic tubular bandage provided for use as needed until next PHYSICAL THERAPY visit     gait training to improve functional mobility and safety for 00 minutes:      Home Exercises and Patient Education Provided  Education provided:   - yes    Home Exercises Provided: yes.  Exercises were reviewed and Freddy was able to demonstrate them prior to the end of the session.  Freddy demonstrated good  understanding of the education provided.     Assessment     Freddy is a 34 y.o. referred to outpatient Physical Therapy with a medical diagnosis of S83.512A (ICD-10-CM) - Rupture of anterior cruciate ligament of left knee, initial " encounter Z98.890 (ICD-10-CM) - S/P ACL reconstruction.     Pt presents with decreased ROM, muscle strength, flexibility, joint mobility. Increased pain, stiffness, soft tissue restriction. Impaired posture, joint mechanics, balance, gait pattern.     The patient's current task deficits include the following: limitation in ADL, self care, social/recreational tasks.     Patient prognosis: good  Rehab potential: good    Patient will benefit from skilled outpatient Physical Therapy to address the deficits stated above and in the chart below, provide patient /family education, to maximize patient's level of independence, and to address functional deficits.    Plan of care discussed with patient: Yes  Patient's spiritual, cultural and educational needs considered and patient is agreeable to the plan of care and goals as stated below:     Anticipated Barriers for therapy: chronicity of current injury and post operative nature of current condition     Medical Necessity is demonstrated by the following  History  Co-morbidities and personal factors that may impact the plan of care [] LOW: no personal factors / co-morbidities  [x] MODERATE: 1-2 personal factors / co-morbidities  [] HIGH: 3+ personal factors / co-morbidities    Moderate / High Support Documentation:   Co-morbidities affecting plan of care: none    Personal Factors:   coping style  social background  lifestyle     Examination  Body Structures and Functions, activity limitations and participation restrictions that may impact the plan of care [] LOW: addressing 1-2 elements  [x] MODERATE: 3+ elements  [] HIGH: 4+ elements    Clinical Presentation [] LOW: stable  [x] MODERATE: Evolving  [] HIGH: Unstable     Decision Making/ Complexity Score: moderate       Goals:   Short Terms Goals: 6 weeks    Goal  Progress  Date    (1)  Patient will be I with HOME EXERCISE PROGRAM  Initial, Not Met 1/3/2025     (2)  Patient will obtain +3 deg L knee ext ACTIVE RANGE OF MOTION  to indicate improved ability to stand, > 60 minutes   Initial, Not Met  1/3/2025     (3)   Patient will obtain > 120 deg L knee flex ACTIVE RANGE OF MOTION to indicate improved sitting tolerance, > 60 minutes    Initial, Not Met  1/3/2025       Long Term Goals: 12 weeks    Goal  Progress  Date    (1)  Patient will obtain 5/5 L knee ext MMT to indicate improved ability to walk, > 60 minutes  Initial, Not Met  1/3/2025   (2)   Patient will obtain 5/5 L knee flex MMT to indicate improved ability to negotiate flight of stairs, alternating gait pattern, no HRA   Initial, Not Met 1/3/2025    (3)   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    Outpatient Physical Therapy 3 times weekly for 2 weeks, then 2 times weekly for 10 weeks to include the following interventions: Cervical/Lumbar Traction, Electrical Stimulation re-eval, dry needling, Gait Training, Iontophoresis (with ), Manual Therapy, Moist Heat/ Ice, Neuromuscular Re-ed, Patient Education, Self Care, Therapeutic Activities, and Therapeutic Exercise.     Physical therapist and physical therapy assistant(s) will met face to face to discuss patient's treatment plan and progress towards established goals. Pt will be seen by a physical therapist minimally every 6th visit or every 30 days.    Stacie Diaz, PT, DPT  1/3/2025       I CERTIFY THE NEED FOR THESE SERVICES FURNISHED UNDER THIS PLAN OF TREATMENT AND WHILE UNDER MY CARE     Physician's comments:           Physician's Signature: ___________________________________________________

## 2025-01-04 NOTE — PLAN OF CARE
[facility]   Physical Therapy Initial Evaluation      Name: Freddy Villegas  [facility] Number: [id number]    Therapy Diagnosis:   Encounter Diagnoses   Name Primary?    Sprain of anterior cruciate ligament of left knee, subsequent encounter Yes    Difficulty walking      Physician: Gaetano Polk MD    Physician Orders: PT Eval and Treat   Medical Diagnosis from Referral: S83.512A (ICD-10-CM) - Rupture of anterior cruciate ligament of left knee, initial encounter Z98.890 (ICD-10-CM) - S/P ACL reconstruction  Evaluation Date: 1/3/2025  Authorization Period Expiration: 12/9/2025  Plan of Care Expiration: 3/3/2025  Visit # / Visits authorized: 1/ 1  #Visits based on PHYSICAL THERAPY POC: 26     Foto  Date  Score      Knee  Lower Score = Greater Disability   #1/3 1/3/2025 31.3   #2/3     #3/3       Time in  10:17am   Time out 11:20am   Total Appointment Time (timed & untimed codes) 63 minutes      Precautions: Standard    Subjective     Date of injury: August 2024  History of current condition - patient was playing basketball, landed on the ground and felt his L knee buckle resulting in falling to the ground. He then found out her tore his L ACL.     Prior medical treatment: knee brace and physical therapy     History of falls/MVAs: none     Current functional status  Severe limitation    Previous functional status  No limitation    Patient stated goal Return to previous level of function      Imaging:  L X-ray FINDINGS: No acute fracture or dislocation.  Slight bilateral lateral patellar tilt and patellofemoral joint space narrowing.  Small left joint effusion.     L knee MRI Impression:  1. There appears to be evidence of intermediate grade ACL strain.  2. Subtle intermediate signal in the proximal PCL suggestive of low-grade strain is seen.  3. There is intermediate increased T2 signal in the anterior horn root ligament of the lateral meniscus suggestive of low to intermediate grade  "strain.  Otherwise, the lateral meniscus appears intact.  4. Minimal edema like signal along the posterior distal fibers of the vastus medialis and vastus lateralis muscles may reflect low-grade strains.  5. There is attenuation of the MPFL with subtle increased adjacent T2 signal along its deep fibers near the medial epicondyle insertion which may reflect minimal partial thickness deep fiber tearing.  6. Medial head gastrocnemius tendinopathy is seen with adjacent ganglia.  7. Baker's cyst.  8. Additional findings and details as above.     Patient discussed MRI with Dr. Polk, who indicated a Gr III ACL tear at L knee.     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    Surgical History:   Freddy Villegas  has a past surgical history that includes Clavicle surgery.    Medications:   Freddy has a current medication list which includes the following prescription(s): cephalexin and oxycodone-acetaminophen.    Allergies:   Review of patient's allergies indicates:  No Known Allergies     Objective    Observation/posture: presents wearing brace locked at L knee/leg with B axillary crutches (crutches at a height too low for patient)    Gait: non weight bearing on L LE    Range of Motion:   Knee Right  Left    Flexion 144 tba   Extension 0 -3     Lower Extremity Strength       Knee           Right          Left     Extension: 5/5 tba   Flexion: 4+/5 tba     Function/balance:    Right  Left    Single leg stance  - 30"  tba   Squat tba  - -     Joint Mobility:    Right  Left    PFJ  WNL Impaired    Knee joint   WNL Impaired      Edema:    Joint line in cm 10 cm distal to tibial tuberosity in cm 5 cm above in cm   Right 39.0 36.5 40.0   Left  41.0 " "37.0 41.5     Treatment   Total Treatment time (time-based codes) separate from Evaluation: 48 minutes    + indicates new exercise   Bold indicates completed exercise    therapeutic exercises to develop strength, endurance, ROM, flexibility, posture, and core stabilization for 15 minutes:  Patient education on nature of current condition and PHYSICAL THERAPY POC  Written HOME EXERCISE PROGRAM provided, reviewed, patient demo understanding   Prone knee hang x 4'    manual therapy techniques: Joint mobilizations, Manual traction, Myofacial release, Soft tissue Mobilization, and Friction Massage for 00 minutes:  L PFJ, knee joint     neuromuscular re-education activities to improve: Balance, Coordination, Kinesthetic, Sense, Proprioception, and Posture for 8 minutes:  Quad sets, 5" hold, 3x10     therapeutic activities to improve functional performance for 25 minutes:  Crutch height adjusted to be taller with 4 finger gap between axilla and top of crutch   Post op dressing removal   Middle layer of wound dressing removed (between bandage wrap and gauze pads)   Gauze at lateral incisions replaced, medial incision gauze left in place to not disturb closure and healing of this incision   Bandage wrap replaced in half overlapping pattern going distal lower leg to mid femur   Additional gauze and elastic tubular bandage provided for use as needed until next PHYSICAL THERAPY visit     gait training to improve functional mobility and safety for 00 minutes:      Home Exercises and Patient Education Provided  Education provided:   - yes    Home Exercises Provided: yes.  Exercises were reviewed and Freddy was able to demonstrate them prior to the end of the session.  Freddy demonstrated good  understanding of the education provided.     Assessment     Freddy is a 34 y.o. referred to outpatient Physical Therapy with a medical diagnosis of S83.512A (ICD-10-CM) - Rupture of anterior cruciate ligament of left knee, initial " encounter Z98.890 (ICD-10-CM) - S/P ACL reconstruction.     Pt presents with decreased ROM, muscle strength, flexibility, joint mobility. Increased pain, stiffness, soft tissue restriction. Impaired posture, joint mechanics, balance, gait pattern.     The patient's current task deficits include the following: limitation in ADL, self care, social/recreational tasks.     Patient prognosis: good  Rehab potential: good    Patient will benefit from skilled outpatient Physical Therapy to address the deficits stated above and in the chart below, provide patient /family education, to maximize patient's level of independence, and to address functional deficits.    Plan of care discussed with patient: Yes  Patient's spiritual, cultural and educational needs considered and patient is agreeable to the plan of care and goals as stated below:     Anticipated Barriers for therapy: chronicity of current injury and post operative nature of current condition     Medical Necessity is demonstrated by the following  History  Co-morbidities and personal factors that may impact the plan of care [] LOW: no personal factors / co-morbidities  [x] MODERATE: 1-2 personal factors / co-morbidities  [] HIGH: 3+ personal factors / co-morbidities    Moderate / High Support Documentation:   Co-morbidities affecting plan of care: none    Personal Factors:   coping style  social background  lifestyle     Examination  Body Structures and Functions, activity limitations and participation restrictions that may impact the plan of care [] LOW: addressing 1-2 elements  [x] MODERATE: 3+ elements  [] HIGH: 4+ elements    Clinical Presentation [] LOW: stable  [x] MODERATE: Evolving  [] HIGH: Unstable     Decision Making/ Complexity Score: moderate       Goals:   Short Terms Goals: 6 weeks    Goal  Progress  Date    (1)  Patient will be I with HOME EXERCISE PROGRAM  Initial, Not Met 1/3/2025     (2)  Patient will obtain +3 deg L knee ext ACTIVE RANGE OF MOTION  to indicate improved ability to stand, > 60 minutes   Initial, Not Met  1/3/2025     (3)   Patient will obtain > 120 deg L knee flex ACTIVE RANGE OF MOTION to indicate improved sitting tolerance, > 60 minutes    Initial, Not Met  1/3/2025       Long Term Goals: 12 weeks    Goal  Progress  Date    (1)  Patient will obtain 5/5 L knee ext MMT to indicate improved ability to walk, > 60 minutes  Initial, Not Met  1/3/2025   (2)   Patient will obtain 5/5 L knee flex MMT to indicate improved ability to negotiate flight of stairs, alternating gait pattern, no HRA   Initial, Not Met 1/3/2025    (3)   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    Outpatient Physical Therapy 3 times weekly for 2 weeks, then 2 times weekly for 10 weeks to include the following interventions: Cervical/Lumbar Traction, Electrical Stimulation re-eval, dry needling, Gait Training, Iontophoresis (with ), Manual Therapy, Moist Heat/ Ice, Neuromuscular Re-ed, Patient Education, Self Care, Therapeutic Activities, and Therapeutic Exercise.     Physical therapist and physical therapy assistant(s) will met face to face to discuss patient's treatment plan and progress towards established goals. Pt will be seen by a physical therapist minimally every 6th visit or every 30 days.    Stacie Diaz, PT, DPT  1/3/2025       I CERTIFY THE NEED FOR THESE SERVICES FURNISHED UNDER THIS PLAN OF TREATMENT AND WHILE UNDER MY CARE     Physician's comments:           Physician's Signature: ___________________________________________________

## 2025-01-06 ENCOUNTER — CLINICAL SUPPORT (OUTPATIENT)
Dept: REHABILITATION | Facility: HOSPITAL | Age: 35
End: 2025-01-06
Payer: COMMERCIAL

## 2025-01-06 DIAGNOSIS — R26.2 DIFFICULTY WALKING: ICD-10-CM

## 2025-01-06 DIAGNOSIS — S83.512D SPRAIN OF ANTERIOR CRUCIATE LIGAMENT OF LEFT KNEE, SUBSEQUENT ENCOUNTER: Primary | ICD-10-CM

## 2025-01-06 PROCEDURE — 97110 THERAPEUTIC EXERCISES: CPT | Mod: PN | Performed by: PHYSICAL THERAPIST

## 2025-01-06 PROCEDURE — 97530 THERAPEUTIC ACTIVITIES: CPT | Mod: PN | Performed by: PHYSICAL THERAPIST

## 2025-01-06 PROCEDURE — 97140 MANUAL THERAPY 1/> REGIONS: CPT | Mod: PN | Performed by: PHYSICAL THERAPIST

## 2025-01-06 PROCEDURE — 97112 NEUROMUSCULAR REEDUCATION: CPT | Mod: PN | Performed by: PHYSICAL THERAPIST

## 2025-01-06 NOTE — PROGRESS NOTES
"[facility]   Physical Therapy Treatment Note     Name: Freddy Villegas  [employer] [employer] Number: [id number]    Therapy Diagnosis:   Encounter Diagnoses   Name Primary?    Sprain of anterior cruciate ligament of left knee, subsequent encounter Yes    Difficulty walking      Physician: Gaetano Polk MD    Physician Orders: PT Eval and Treat   Medical Diagnosis from Referral: S83.512A (ICD-10-CM) - Rupture of anterior cruciate ligament of left knee, initial encounter Z98.890 (ICD-10-CM) - S/P ACL reconstruction  Evaluation Date: 1/6/2025  Authorization Period Expiration: 3/21/2025  Plan of Care Expiration: 3/3/2025  Visit # / Visits authorized: 2/12  #Visits based on PHYSICAL THERAPY POC: 26     Foto  Date  Score      Knee  Lower Score = Greater Disability   #1/3 1/3/2025 31.3   #2/3     #3/3       Time in     Time out    Total Appointment Time      Precautions: Standard        Subjective     HOME EXERCISE PROGRAM     Response     Current functional status  Severe limitation    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, coaches football, teaches at school     Pain:      Current 7/10     Location L knee     Objective    Observation/posture: presents wearing brace locked at L knee/leg with B axillary crutches (crutches at a height too low for patient)    Gait: non weight bearing on L LE    Range of Motion:   Knee Right  Left    Flexion 144 tba   Extension 0 -3     Lower Extremity Strength       Knee           Right          Left     Extension: 5/5 tba   Flexion: 4+/5 tba     Function/balance:    Right  Left    Single leg stance  - 30"  tba   Squat tba  - -     Joint Mobility:    Right  Left    PFJ  WNL Impaired    Knee joint   WNL Impaired      Edema:    Joint line in cm 10 cm distal to tibial tuberosity in cm 5 cm above in cm   Right 39.0 36.5 40.0   Left  41.0 37.0 41.5     Treatment   + " "indicates new exercise   Bold indicates completed exercise    therapeutic exercises to develop strength, endurance, ROM, flexibility, posture, and core stabilization for 15 minutes:  Patient education on nature of current condition and PHYSICAL THERAPY POC  Written HOME EXERCISE PROGRAM provided, reviewed, patient demo understanding   Prone knee hang x 4'    manual therapy techniques: Joint mobilizations, Manual traction, Myofacial release, Soft tissue Mobilization, and Friction Massage for 00 minutes:  L PFJ, knee joint     neuromuscular re-education activities to improve: Balance, Coordination, Kinesthetic, Sense, Proprioception, and Posture for 8 minutes:  Quad sets, 5" hold, 3x10     therapeutic activities to improve functional performance for 25 minutes:  Crutch height adjusted to be taller with 4 finger gap between axilla and top of crutch   Post op dressing removal   Middle layer of wound dressing removed (between bandage wrap and gauze pads)   Gauze at lateral incisions replaced, medial incision gauze left in place to not disturb closure and healing of this incision   Bandage wrap replaced in half overlapping pattern going distal lower leg to mid femur   Additional gauze and elastic tubular bandage provided for use as needed until next PHYSICAL THERAPY visit     gait training to improve functional mobility and safety for 00 minutes:      Home Exercises and Patient Education Provided  Education provided:   - yes    Home Exercises Provided: yes.  Exercises were reviewed and Freddy was able to demonstrate them prior to the end of the session.  Freddy demonstrated good  understanding of the education provided.     Assessment       Pt presents with decreased ROM, muscle strength, flexibility, joint mobility. Increased pain, stiffness, soft tissue restriction. Impaired posture, joint mechanics, balance, gait pattern.     The patient's current task deficits include the following: limitation in ADL, self care, " social/recreational tasks.     Patient prognosis: good  Rehab potential: good    Patient will benefit from skilled outpatient Physical Therapy to address the deficits stated above and in the chart below, provide patient /family education, to maximize patient's level of independence, and to address functional deficits.    Anticipated Barriers for therapy: chronicity of current injury and post operative nature of current condition     Goals:   Short Terms Goals: 6 weeks    Goal  Progress  Date    (1)  Patient will be I with HOME EXERCISE PROGRAM  Initial, Not Met 1/6/2025     (2)  Patient will obtain +3 deg L knee ext ACTIVE RANGE OF MOTION to indicate improved ability to stand, > 60 minutes   Initial, Not Met  1/6/2025     (3)   Patient will obtain > 120 deg L knee flex ACTIVE RANGE OF MOTION to indicate improved sitting tolerance, > 60 minutes    Initial, Not Met  1/6/2025       Long Term Goals: 12 weeks    Goal  Progress  Date    (1)  Patient will obtain 5/5 L knee ext MMT to indicate improved ability to walk, > 60 minutes  Initial, Not Met  1/6/2025   (2)   Patient will obtain 5/5 L knee flex MMT to indicate improved ability to negotiate flight of stairs, alternating gait pattern, no HRA   Initial, Not Met 1/6/2025    (3)   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    Outpatient Physical Therapy 3 times weekly for 2 weeks, then 2 times weekly for 10 weeks to include the following interventions: Cervical/Lumbar Traction, Electrical Stimulation re-eval, dry needling, Gait Training, Iontophoresis (with ), Manual Therapy, Moist Heat/ Ice, Neuromuscular Re-ed, Patient Education, Self Care, Therapeutic Activities, and Therapeutic Exercise.     Physical therapist and physical therapy assistant(s) will met face to face to discuss patient's treatment plan and progress towards established  goals. Pt will be seen by a physical therapist minimally every 6th visit or every 30 days.    Stacie Diaz, PT, DPT

## 2025-01-06 NOTE — PROGRESS NOTES
"[facility]   Physical Therapy Treatment Note     Name: Freddy Villegas  [facility] Number: [id number]    Therapy Diagnosis:   Encounter Diagnoses   Name Primary?    Sprain of anterior cruciate ligament of left knee, subsequent encounter Yes    Difficulty walking      Physician: Gaetano Polk MD    Physician Orders: PT Eval and Treat   Medical Diagnosis from Referral: S83.512A (ICD-10-CM) - Rupture of anterior cruciate ligament of left knee, initial encounter Z98.890 (ICD-10-CM) - S/P ACL reconstruction  Evaluation Date: 1/6/2025  Authorization Period Expiration: 3/21/2025  Plan of Care Expiration: 3/3/2025  Visit # / Visits authorized: 2/12  #Visits based on PHYSICAL THERAPY POC: 26     Foto  Date  Score      Knee  Lower Score = Greater Disability   #1/3 1/3/2025 31.3   #2/3     #3/3       Time in  10:00am   Time out 11:17am   Total Appointment Time 77 minutes      Precautions: Standard        Subjective     HOME EXERCISE PROGRAM  Reviewed, reports compliance    Response  Continues with soreness at L LE/knee    Current functional status  Severe limitation    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, coaches football, teaches at school     Pain:      Current 7/10     Location L knee     Objective    Observation/posture: presents wearing brace locked at L knee/leg with B axillary crutches (crutches at a height too low for patient)    Gait: non weight bearing on L LE    Range of Motion:   Knee Right  Left    Flexion 144 tba   Extension 0 -3     Lower Extremity Strength       Knee           Right          Left     Extension: 5/5 tba   Flexion: 4+/5 tba     Function/balance:    Right  Left    Single leg stance  - 30"  tba   Squat tba  - -     Joint Mobility:    Right  Left    PFJ  WNL Impaired    Knee joint   WNL Impaired      Edema:    Joint line in cm 10 cm distal to tibial " "tuberosity in cm 5 cm above in cm   Right 39.0 36.5 40.0   Left  41.0 37.0 41.5     Treatment   + indicates new exercise   Bold indicates completed exercise    therapeutic exercises to develop strength, endurance, ROM, flexibility, posture, and core stabilization for 45 minutes:  Written HOME EXERCISE PROGRAM provided, reviewed, patient demo understanding   Prone knee hang x 4'  +Seated L knee ext stretch, x 10'  +L heel slide with board and strap, 10"x10  +L ankle pumps, 3x10   +L gastroc stretch, 30"x3     manual therapy techniques: Joint mobilizations, Manual traction, Myofacial release, Soft tissue Mobilization, and Friction Massage for 8 minutes:  L PFJ, knee joint, Gr II, III, IV--      neuromuscular re-education activities to improve: Balance, Coordination, Kinesthetic, Sense, Proprioception, and Posture for 10 minutes:  +Niuean current - L quad sets, 10" on/10" off x 8'    therapeutic activities to improve functional performance for 14 minutes:  Post op dressing changes  +Gauze at lateral and medial incisions removed and 4 Band aides placed at L anterior knee; 1 over each incision   +Elastic tubular bandage placed on to L leg     gait training to improve functional mobility and safety for 00 minutes:      Home Exercises and Patient Education Provided  Education provided:   - yes    Home Exercises Provided: yes.  Exercises were reviewed and Freddy was able to demonstrate them prior to the end of the session.  Freddy demonstrated good  understanding of the education provided.     Assessment   Patient completes progression of exercises to include gastroc stretch with strap, ankle pumps, and heel slides. He experiences global L knee discomfort with heel slides.     Pt presents with decreased ROM, muscle strength, flexibility, joint mobility. Increased pain, stiffness, soft tissue restriction. Impaired posture, joint mechanics, balance, gait pattern.     The patient's current task deficits include the " following: limitation in ADL, self care, social/recreational tasks.     Patient prognosis: good  Rehab potential: good    Patient will benefit from skilled outpatient Physical Therapy to address the deficits stated above and in the chart below, provide patient /family education, to maximize patient's level of independence, and to address functional deficits.    Anticipated Barriers for therapy: chronicity of current injury and post operative nature of current condition     Goals:   Short Terms Goals: 6 weeks    Goal  Progress  Date    (1)  Patient will be I with HOME EXERCISE PROGRAM  Initial, Not Met 1/6/2025     (2)  Patient will obtain +3 deg L knee ext ACTIVE RANGE OF MOTION to indicate improved ability to stand, > 60 minutes   Initial, Not Met  1/6/2025     (3)   Patient will obtain > 120 deg L knee flex ACTIVE RANGE OF MOTION to indicate improved sitting tolerance, > 60 minutes    Initial, Not Met  1/6/2025       Long Term Goals: 12 weeks    Goal  Progress  Date    (1)  Patient will obtain 5/5 L knee ext MMT to indicate improved ability to walk, > 60 minutes  Initial, Not Met  1/6/2025   (2)   Patient will obtain 5/5 L knee flex MMT to indicate improved ability to negotiate flight of stairs, alternating gait pattern, no HRA   Initial, Not Met 1/6/2025    (3)   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    Outpatient Physical Therapy 3 times weekly for 2 weeks, then 2 times weekly for 10 weeks to include the following interventions: Cervical/Lumbar Traction, Electrical Stimulation re-eval, dry needling, Gait Training, Iontophoresis (with ), Manual Therapy, Moist Heat/ Ice, Neuromuscular Re-ed, Patient Education, Self Care, Therapeutic Activities, and Therapeutic Exercise.     Physical therapist and physical therapy assistant(s) will met face to face to discuss patient's  treatment plan and progress towards established goals. Pt will be seen by a physical therapist minimally every 6th visit or every 30 days.    Stacie Diaz, PT, DPT

## 2025-01-07 ENCOUNTER — CLINICAL SUPPORT (OUTPATIENT)
Dept: REHABILITATION | Facility: HOSPITAL | Age: 35
End: 2025-01-07
Attending: PHYSICAL THERAPIST
Payer: COMMERCIAL

## 2025-01-07 DIAGNOSIS — R26.2 DIFFICULTY WALKING: ICD-10-CM

## 2025-01-07 DIAGNOSIS — S83.512D SPRAIN OF ANTERIOR CRUCIATE LIGAMENT OF LEFT KNEE, SUBSEQUENT ENCOUNTER: Primary | ICD-10-CM

## 2025-01-07 PROCEDURE — 97112 NEUROMUSCULAR REEDUCATION: CPT | Mod: PN | Performed by: PHYSICAL THERAPIST

## 2025-01-07 PROCEDURE — 97140 MANUAL THERAPY 1/> REGIONS: CPT | Mod: PN | Performed by: PHYSICAL THERAPIST

## 2025-01-07 PROCEDURE — 97110 THERAPEUTIC EXERCISES: CPT | Mod: PN | Performed by: PHYSICAL THERAPIST

## 2025-01-07 NOTE — PROGRESS NOTES
"[facility]   Physical Therapy Treatment Note     Name: Freddy Villegas  [facility] Number: [id number]    Therapy Diagnosis:   Encounter Diagnoses   Name Primary?    Sprain of anterior cruciate ligament of left knee, subsequent encounter Yes    Difficulty walking      Physician: Gaetano Polk MD    Physician Orders: PT Eval and Treat   Medical Diagnosis from Referral: S83.512A (ICD-10-CM) - Rupture of anterior cruciate ligament of left knee, initial encounter Z98.890 (ICD-10-CM) - S/P ACL reconstruction  Evaluation Date: 1/7/2025  Authorization Period Expiration: 3/21/2025  Plan of Care Expiration: 3/3/2025  Visit # / Visits authorized: 2/12  #Visits based on PHYSICAL THERAPY POC: 26     Foto  Date  Score      Knee  Lower Score = Greater Disability   #1/3 1/3/2025 31.3   #2/3     #3/3       Time in  2:09pm   Time out 3:15pm   Total Appointment Time 66 minutes      Precautions: Standard        Subjective     HOME EXERCISE PROGRAM  Reviewed, reports compliance    Response  Feeling like he is able to better active his L quads today    Current functional status  Severe limitation - continues with non weight bearing and use of B axillary crutches for mobility tasks, brace locked in extension    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, coaches football, teaches at school     Pain:      Current 7/10     Location L knee     Objective    Observation/posture: presents wearing brace locked at L knee/leg with B axillary crutches (crutches at a height too low for patient)    Gait: non weight bearing on L LE    Range of Motion:   Knee Right  Left    Flexion 144 tba   Extension 0 -3     Lower Extremity Strength       Knee           Right          Left     Extension: 5/5 tba   Flexion: 4+/5 tba     Function/balance:    Right  Left    Single leg stance  - 30"  tba   Squat tba  - -     Joint " "Mobility:    Right  Left    PFJ  WNL Impaired    Knee joint   WNL Impaired      Edema:    Joint line in cm 10 cm distal to tibial tuberosity in cm 5 cm above in cm   Right 39.0 36.5 40.0   Left  41.0 37.0 41.5     Treatment   + indicates new exercise   Bold indicates completed exercise    therapeutic exercises to develop strength, endurance, ROM, flexibility, posture, and core stabilization for 46 minutes:  Written HOME EXERCISE PROGRAM provided, reviewed, patient demo understanding   Prone knee hang x 4'  Seated L knee ext stretch, x 10'  L heel slide with board and strap, 10"x10  L ankle pumps, 3x10   L gastroc stretch with strap, 30"x3   +L soleus stretch with strap, bolster under knee, 30"x3   +Gluteal isometric, 5" hold, 3x10  +L hamstring isometric, 5" hold, 3x10    manual therapy techniques: Joint mobilizations, Manual traction, Myofacial release, Soft tissue Mobilization, and Friction Massage for 10 minutes:  L PFJ, knee joint (extension), Gr II/III/IV JM     neuromuscular re-education activities to improve: Balance, Coordination, Kinesthetic, Sense, Proprioception, and Posture for 10 minutes:  Martiniquais current - L quad sets, 10" on/10" off x 10'    therapeutic activities to improve functional performance for 00 minutes:        gait training to improve functional mobility and safety for 00 minutes:      Home Exercises and Patient Education Provided  Education provided:   - yes    Home Exercises Provided: yes.  Exercises were reviewed and Freddy was able to demonstrate them prior to the end of the session.  Freddy demonstrated good  understanding of the education provided.     Assessment   Status stable at area of incisions; no drainage on band aides placed over incisions yesterday, no redness around suture areas. Audible with Gr II L knee flex PA JM followed by improved mobility.     Pt presents with decreased ROM, muscle strength, flexibility, joint mobility. Increased pain, stiffness, soft tissue " restriction. Impaired posture, joint mechanics, balance, gait pattern.     The patient's current task deficits include the following: limitation in ADL, self care, social/recreational tasks.     Patient prognosis: good  Rehab potential: good    Patient will benefit from skilled outpatient Physical Therapy to address the deficits stated above and in the chart below, provide patient /family education, to maximize patient's level of independence, and to address functional deficits.    Anticipated Barriers for therapy: chronicity of current injury and post operative nature of current condition     Goals:   Short Terms Goals: 6 weeks    Goal  Progress  Date    (1)  Patient will be I with HOME EXERCISE PROGRAM  Initial, Not Met 1/7/2025     (2)  Patient will obtain +3 deg L knee ext ACTIVE RANGE OF MOTION to indicate improved ability to stand, > 60 minutes   Initial, Not Met  1/7/2025     (3)   Patient will obtain > 120 deg L knee flex ACTIVE RANGE OF MOTION to indicate improved sitting tolerance, > 60 minutes    Initial, Not Met  1/7/2025       Long Term Goals: 12 weeks    Goal  Progress  Date    (1)  Patient will obtain 5/5 L knee ext MMT to indicate improved ability to walk, > 60 minutes  Initial, Not Met  1/7/2025   (2)   Patient will obtain 5/5 L knee flex MMT to indicate improved ability to negotiate flight of stairs, alternating gait pattern, no HRA   Initial, Not Met 1/7/2025    (3)   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    Outpatient Physical Therapy 3 times weekly for 2 weeks, then 2 times weekly for 10 weeks to include the following interventions: Cervical/Lumbar Traction, Electrical Stimulation re-eval, dry needling, Gait Training, Iontophoresis (with ), Manual Therapy, Moist Heat/ Ice, Neuromuscular Re-ed, Patient Education, Self Care, Therapeutic Activities, and  Therapeutic Exercise.     Physical therapist and physical therapy assistant(s) will met face to face to discuss patient's treatment plan and progress towards established goals. Pt will be seen by a physical therapist minimally every 6th visit or every 30 days.    Stacie Diaz, PT, DPT

## 2025-01-07 NOTE — PROGRESS NOTES
"[facility]   Physical Therapy Treatment Note     Name: Freddy Villegas  [facility] Number: [id number]    Therapy Diagnosis:   No diagnosis found.    Physician: Gaetano Polk MD    Physician Orders: PT Eval and Treat   Medical Diagnosis from Referral: S83.512A (ICD-10-CM) - Rupture of anterior cruciate ligament of left knee, initial encounter Z98.890 (ICD-10-CM) - S/P ACL reconstruction  Evaluation Date: 1/7/2025  Authorization Period Expiration: 3/21/2025  Plan of Care Expiration: 3/3/2025  Visit # / Visits authorized: 2/12 +eval  #Visits based on PHYSICAL THERAPY POC: 26     Foto  Date  Score      Knee  Lower Score = Greater Disability   #1/3 1/3/2025 31.3   #2/3     #3/3       Time in  *** pm   Time out *** pm   Total Appointment Time *** minutes     Precautions: Standard        Subjective     HOME EXERCISE PROGRAM  ***   Response  ***   Current functional status  Severe limitation    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, coaches football, teaches at school     Pain:      Current 7/10     Location L knee     Objective    Observation/posture: presents wearing brace locked at L knee/leg with B axillary crutches (crutches at a height too low for patient)    Gait: non weight bearing on L LE    Range of Motion:   Knee Right  Left    Flexion 144 tba   Extension 0 -3     Lower Extremity Strength       Knee           Right          Left     Extension: 5/5 tba   Flexion: 4+/5 tba     Function/balance:    Right  Left    Single leg stance  - 30"  tba   Squat tba  - -     Joint Mobility:    Right  Left    PFJ  WNL Impaired    Knee joint   WNL Impaired      Edema:    Joint line in cm 10 cm distal to tibial tuberosity in cm 5 cm above in cm   Right 39.0 36.5 40.0   Left  41.0 37.0 41.5     Treatment   + indicates new exercise   Bold indicates completed exercise    therapeutic exercises " "to develop strength, endurance, ROM, flexibility, posture, and core stabilization for 15 minutes:  Written HOME EXERCISE PROGRAM provided, reviewed, patient demo understanding   Prone knee hang x 4'  +Seated L knee ext stretch, x 10'  +L heel slide with board and strap, 10"x10  +L ankle pumps, 3x10   +L gastroc stretch, 30"x3     manual therapy techniques: Joint mobilizations, Manual traction, Myofacial release, Soft tissue Mobilization, and Friction Massage for 00 minutes:  L PFJ, knee joint     neuromuscular re-education activities to improve: Balance, Coordination, Kinesthetic, Sense, Proprioception, and Posture for 8 minutes:  +[place] current - L quad sets, 10" on/10" off x 8'    therapeutic activities to improve functional performance for 25 minutes:    Post op dressing removal   Middle layer of wound dressing removed (between bandage wrap and gauze pads)   Gauze at lateral incisions replaced, medial incision gauze left in place to not disturb closure and healing of this incision   Bandage wrap replaced in half overlapping pattern going distal lower leg to mid femur   Additional gauze and elastic tubular bandage provided for use as needed until next PHYSICAL THERAPY visit     gait training to improve functional mobility and safety for 00 minutes:      Home Exercises and Patient Education Provided  Education provided:   - yes    Home Exercises Provided: yes.  Exercises were reviewed and Freddy was able to demonstrate them prior to the end of the session.  Freddy demonstrated good  understanding of the education provided.     Assessment       Pt *** presents with decreased ROM, muscle strength, flexibility, joint mobility. Increased pain, stiffness, soft tissue restriction. Impaired posture, joint mechanics, balance, gait pattern.     The patient's current task deficits include the following: limitation in ADL, self care, social/recreational tasks.     Patient prognosis: good  Rehab potential: good    Patient " will benefit from skilled outpatient Physical Therapy to address the deficits stated above and in the chart below, provide patient /family education, to maximize patient's level of independence, and to address functional deficits.    Anticipated Barriers for therapy: chronicity of current injury and post operative nature of current condition     Goals:   Short Terms Goals: 6 weeks    Goal  Progress  Date    (1)  Patient will be I with HOME EXERCISE PROGRAM  Initial, Not Met 1/7/2025     (2)  Patient will obtain +3 deg L knee ext ACTIVE RANGE OF MOTION to indicate improved ability to stand, > 60 minutes   Initial, Not Met  1/7/2025     (3)   Patient will obtain > 120 deg L knee flex ACTIVE RANGE OF MOTION to indicate improved sitting tolerance, > 60 minutes    Initial, Not Met  1/7/2025       Long Term Goals: 12 weeks    Goal  Progress  Date    (1)  Patient will obtain 5/5 L knee ext MMT to indicate improved ability to walk, > 60 minutes  Initial, Not Met  1/7/2025   (2)   Patient will obtain 5/5 L knee flex MMT to indicate improved ability to negotiate flight of stairs, alternating gait pattern, no HRA   Initial, Not Met 1/7/2025    (3)   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    Outpatient Physical Therapy 3 times weekly for 2 weeks, then 2 times weekly for 10 weeks to include the following interventions: Cervical/Lumbar Traction, Electrical Stimulation re-eval, dry needling, Gait Training, Iontophoresis (with ), Manual Therapy, Moist Heat/ Ice, Neuromuscular Re-ed, Patient Education, Self Care, Therapeutic Activities, and Therapeutic Exercise.     Physical therapist and physical therapy assistant(s) will met face to face to discuss patient's treatment plan and progress towards established goals. Pt will be seen by a physical therapist minimally every 6th visit or every 30  days.    Linda Mercer, PTA

## 2025-01-08 NOTE — PROGRESS NOTES
"[facility]   Physical Therapy Treatment Note     Name: Freddy Villegas  [facility] Number: [id number]    Therapy Diagnosis:   Encounter Diagnoses   Name Primary?    Sprain of anterior cruciate ligament of left knee, subsequent encounter Yes    Difficulty walking      Physician: Gaetano Polk MD    Physician Orders: PT Eval and Treat   Medical Diagnosis from Referral: S83.512A (ICD-10-CM) - Rupture of anterior cruciate ligament of left knee, initial encounter Z98.890 (ICD-10-CM) - S/P ACL reconstruction  Evaluation Date: 1/9/2025  Authorization Period Expiration: 3/21/2025  Plan of Care Expiration: 3/3/2025  Visit # / Visits authorized: 4/12  #Visits based on PHYSICAL THERAPY POC: 26     Foto  Date  Score      Knee  Lower Score = Greater Disability   #1/3 1/3/2025 31.3   #2/3     #3/3       Time in  2:07pm   Time out 3:19pm   Total Appointment Time 72 minutes      Precautions: Standard        Subjective     HOME EXERCISE PROGRAM  Reviewed, reports compliance    Response  Feeling less pain with overall movement today   Current functional status  Dressed himself (with exception of sock this morning) and make his own lunch today so doing better with ADLs   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, coaches football, teaches at school     Pain:      Current 7/10     Location L knee     Objective    Observation/posture: presents wearing brace locked at L knee/leg with B axillary crutches (crutches at a height too low for patient)    Gait: non weight bearing on L LE    Range of Motion:   Knee Right  Left    Flexion 144 35 AAROM    Extension 0 -3     Lower Extremity Strength       Knee           Right          Left     Extension: 5/5 tba   Flexion: 4+/5 tba     Function/balance:    Right  Left    Single leg stance  - 30"  tba   Squat tba  - -     Joint Mobility:    Right  Left    PFJ  " "WNL Impaired    Knee joint   WNL Impaired      Edema:    Joint line in cm 10 cm distal to tibial tuberosity in cm 5 cm above in cm   Right 39.0 36.5 40.0   Left  41.0 37.0 41.5     Treatment   + indicates new exercise   Bold indicates completed exercise    therapeutic exercises to develop strength, endurance, ROM, flexibility, posture, and core stabilization for 52 minutes:  Written HOME EXERCISE PROGRAM provided, reviewed, patient demo understanding   Prone knee hang x 4'  Seated L knee ext stretch, x 10'  +L ankle DF/PF, yellow band, 3x10   L heel slide with board and strap (to 35 deg AAROM), 10"x10  L ankle pumps, 3x10   L gastroc stretch with strap, 30"x3   L soleus stretch with strap, bolster under knee, 30"x3   Gluteal isometric, 5" hold, 3x10  L hamstring isometric, 5" hold, 3x10    manual therapy techniques: Joint mobilizations, Manual traction, Myofacial release, Soft tissue Mobilization, and Friction Massage for 10 minutes:  L PFJ, knee joint (extension), Gr II/III/IV JM     neuromuscular re-education activities to improve: Balance, Coordination, Kinesthetic, Sense, Proprioception, and Posture for 10 minutes:  Citizen of [place] current - L quad sets, 10" on/10" off x 10'    therapeutic activities to improve functional performance for 00 minutes:        gait training to improve functional mobility and safety for 00 minutes:      Home Exercises and Patient Education Provided  Education provided:   - yes    Home Exercises Provided: yes.  Exercises were reviewed and Freddy was able to demonstrate them prior to the end of the session.  Freddy demonstrated good  understanding of the education provided.     Assessment   Status stable at area of incisions; no drainage on band aides placed over incisions, minimal to no redness around suture areas. Patient obtains 35 deg L knee flex AAROM during heel slides today. 0 deg L knee ext PROM obtained today during manual intervention. Yellow band provided for use at home between " visits for ankle DF/PF.     Pt presents with decreased ROM, muscle strength, flexibility, joint mobility. Increased pain, stiffness, soft tissue restriction. Impaired posture, joint mechanics, balance, gait pattern.     The patient's current task deficits include the following: limitation in ADL, self care, social/recreational tasks.     Patient prognosis: good  Rehab potential: good    Patient will benefit from skilled outpatient Physical Therapy to address the deficits stated above and in the chart below, provide patient /family education, to maximize patient's level of independence, and to address functional deficits.    Anticipated Barriers for therapy: chronicity of current injury and post operative nature of current condition     Goals:   Short Terms Goals: 6 weeks    Goal  Progress  Date    (1)  Patient will be I with HOME EXERCISE PROGRAM  Initial, Not Met 1/9/2025     (2)  Patient will obtain +3 deg L knee ext ACTIVE RANGE OF MOTION to indicate improved ability to stand, > 60 minutes   Initial, Not Met  1/9/2025     (3)   Patient will obtain > 120 deg L knee flex ACTIVE RANGE OF MOTION to indicate improved sitting tolerance, > 60 minutes    Initial, Not Met  1/9/2025       Long Term Goals: 12 weeks    Goal  Progress  Date    (1)  Patient will obtain 5/5 L knee ext MMT to indicate improved ability to walk, > 60 minutes  Initial, Not Met  1/9/2025   (2)   Patient will obtain 5/5 L knee flex MMT to indicate improved ability to negotiate flight of stairs, alternating gait pattern, no HRA   Initial, Not Met 1/9/2025    (3)   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    Outpatient Physical Therapy 3 times weekly for 2 weeks, then 2 times weekly for 10 weeks to include the following interventions: Cervical/Lumbar Traction, Electrical Stimulation re-eval, dry needling, Gait  Training, Iontophoresis (with ), Manual Therapy, Moist Heat/ Ice, Neuromuscular Re-ed, Patient Education, Self Care, Therapeutic Activities, and Therapeutic Exercise.     Physical therapist and physical therapy assistant(s) will met face to face to discuss patient's treatment plan and progress towards established goals. Pt will be seen by a physical therapist minimally every 6th visit or every 30 days.    Stacie Diaz, PT, DPT

## 2025-01-09 ENCOUNTER — CLINICAL SUPPORT (OUTPATIENT)
Dept: REHABILITATION | Facility: HOSPITAL | Age: 35
End: 2025-01-09
Payer: COMMERCIAL

## 2025-01-09 DIAGNOSIS — S83.512D SPRAIN OF ANTERIOR CRUCIATE LIGAMENT OF LEFT KNEE, SUBSEQUENT ENCOUNTER: Primary | ICD-10-CM

## 2025-01-09 DIAGNOSIS — R26.2 DIFFICULTY WALKING: ICD-10-CM

## 2025-01-09 PROCEDURE — 97140 MANUAL THERAPY 1/> REGIONS: CPT | Mod: PN | Performed by: PHYSICAL THERAPIST

## 2025-01-09 PROCEDURE — 97112 NEUROMUSCULAR REEDUCATION: CPT | Mod: PN | Performed by: PHYSICAL THERAPIST

## 2025-01-09 PROCEDURE — 97110 THERAPEUTIC EXERCISES: CPT | Mod: PN | Performed by: PHYSICAL THERAPIST

## 2025-01-10 NOTE — PROGRESS NOTES
"[facility]   Physical Therapy Treatment Note     Name: Freddy Villegas  [facility] Number: [id number]    Therapy Diagnosis:   Encounter Diagnoses   Name Primary?    Sprain of anterior cruciate ligament of left knee, subsequent encounter Yes    Difficulty walking      Physician: Gaetano Polk MD    Physician Orders: PT Eval and Treat   Medical Diagnosis from Referral: S83.512A (ICD-10-CM) - Rupture of anterior cruciate ligament of left knee, initial encounter Z98.890 (ICD-10-CM) - S/P ACL reconstruction  Evaluation Date: 1/13/2025  Authorization Period Expiration: 3/21/2025  Plan of Care Expiration: 3/3/2025  Visit # / Visits authorized: 5/12  #Visits based on PHYSICAL THERAPY POC: 26     Foto  Date  Score      Knee  Lower Score = Greater Disability   #1/3 1/3/2025 31.3   #2/3     #3/3       Time in  11:00am   Time out 12:32pm   Total Appointment Time 92 minutes      Precautions: Standard        Subjective     HOME EXERCISE PROGRAM  Reviewed, reports compliance    Response  Feeling less pain with overall movement today   Current functional status  Dressed himself (with exception of sock this morning) and make his own lunch today so doing better with ADLs   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, coaches football, teaches at school     Pain:      Current 7/10     Location L knee     Objective    Observation/posture: presents wearing brace locked at L knee/leg with B axillary crutches     Gait: non weight bearing on L LE    Range of Motion:   Knee Right  Left    Flexion 144 35 AAROM    Extension 0 -3     Lower Extremity Strength       Knee           Right          Left     Extension: 5/5 tba   Flexion: 4+/5 tba     Function/balance:    Right  Left    Single leg stance  - 30"  tba   Squat tba  - -     Joint Mobility:    Right  Left    PFJ  WNL Impaired    Knee joint   WNL Impaired " "     Edema:    Joint line in cm 10 cm distal to tibial tuberosity in cm 5 cm above in cm   Right 39.0 36.5 40.0   Left  41.0 37.0 41.5     Treatment   + indicates new exercise   Bold indicates completed exercise    therapeutic exercises to develop strength, endurance, ROM, flexibility, posture, and core stabilization for 60 minutes:  Written HOME EXERCISE PROGRAM provided, reviewed, patient demo understanding   Prone L knee hang, #2 at ankle x 5'  Seated L knee ext stretch, x 10'  L ankle DF/PF, yellow band, 3x10   L heel slide with board and strap (to 35 deg AAROM), 10"x10  L ankle pumps, 3x10   L gastroc stretch with strap, 30"x3   L soleus stretch with strap, bolster under knee, 30"x3   Gluteal isometric, 5" hold, 3x10  L hamstring isometric, 5" hold, 3x10    manual therapy techniques: Joint mobilizations, Manual traction, Myofacial release, Soft tissue Mobilization, and Friction Massage for 10 minutes:  L PFJ, knee joint (extension), Gr II/III/IV JM     neuromuscular re-education activities to improve: Balance, Coordination, Kinesthetic, Sense, Proprioception, and Posture for 10 minutes:  [place] current - L quad sets, 10" on/10" off x 10'    therapeutic activities to improve functional performance for 12 minutes:  L knee brace adjustment       gait training to improve functional mobility and safety for 00 minutes:      Home Exercises and Patient Education Provided  Education provided:   - yes    Home Exercises Provided: yes.  Exercises were reviewed and Freddy was able to demonstrate them prior to the end of the session.  Freddy demonstrated good  understanding of the education provided.     Assessment   Emphasis on adjusting brace today with progression of prone knee hang to include #2 at ankle for additional stretch.     Pt presents with decreased ROM, muscle strength, flexibility, joint mobility. Increased pain, stiffness, soft tissue restriction. Impaired posture, joint mechanics, balance, gait pattern. "     The patient's current task deficits include the following: limitation in ADL, self care, social/recreational tasks.     Patient prognosis: good  Rehab potential: good    Patient will benefit from skilled outpatient Physical Therapy to address the deficits stated above and in the chart below, provide patient /family education, to maximize patient's level of independence, and to address functional deficits.    Anticipated Barriers for therapy: chronicity of current injury and post operative nature of current condition     Goals:   Short Terms Goals: 6 weeks    Goal  Progress  Date    (1)  Patient will be I with HOME EXERCISE PROGRAM  Initial, Not Met 1/13/2025     (2)  Patient will obtain +3 deg L knee ext ACTIVE RANGE OF MOTION to indicate improved ability to stand, > 60 minutes   Initial, Not Met  1/13/2025     (3)   Patient will obtain > 120 deg L knee flex ACTIVE RANGE OF MOTION to indicate improved sitting tolerance, > 60 minutes    Initial, Not Met  1/13/2025       Long Term Goals: 12 weeks    Goal  Progress  Date    (1)  Patient will obtain 5/5 L knee ext MMT to indicate improved ability to walk, > 60 minutes  Initial, Not Met  1/13/2025   (2)   Patient will obtain 5/5 L knee flex MMT to indicate improved ability to negotiate flight of stairs, alternating gait pattern, no HRA   Initial, Not Met 1/13/2025    (3)   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    Outpatient Physical Therapy 3 times weekly for 2 weeks, then 2 times weekly for 10 weeks to include the following interventions: Cervical/Lumbar Traction, Electrical Stimulation re-eval, dry needling, Gait Training, Iontophoresis (with ), Manual Therapy, Moist Heat/ Ice, Neuromuscular Re-ed, Patient Education, Self Care, Therapeutic Activities, and Therapeutic Exercise.     Physical therapist and physical therapy  assistant(s) will met face to face to discuss patient's treatment plan and progress towards established goals. Pt will be seen by a physical therapist minimally every 6th visit or every 30 days.    Stacie Diaz, PT, DPT

## 2025-01-13 ENCOUNTER — CLINICAL SUPPORT (OUTPATIENT)
Dept: REHABILITATION | Facility: HOSPITAL | Age: 35
End: 2025-01-13
Attending: PHYSICAL THERAPIST
Payer: COMMERCIAL

## 2025-01-13 DIAGNOSIS — R26.2 DIFFICULTY WALKING: ICD-10-CM

## 2025-01-13 DIAGNOSIS — S83.512D SPRAIN OF ANTERIOR CRUCIATE LIGAMENT OF LEFT KNEE, SUBSEQUENT ENCOUNTER: Primary | ICD-10-CM

## 2025-01-13 PROCEDURE — 97140 MANUAL THERAPY 1/> REGIONS: CPT | Mod: PN | Performed by: PHYSICAL THERAPIST

## 2025-01-13 PROCEDURE — 97530 THERAPEUTIC ACTIVITIES: CPT | Mod: PN | Performed by: PHYSICAL THERAPIST

## 2025-01-13 PROCEDURE — 97112 NEUROMUSCULAR REEDUCATION: CPT | Mod: PN | Performed by: PHYSICAL THERAPIST

## 2025-01-13 PROCEDURE — 97110 THERAPEUTIC EXERCISES: CPT | Mod: PN | Performed by: PHYSICAL THERAPIST

## 2025-01-14 ENCOUNTER — CLINICAL SUPPORT (OUTPATIENT)
Dept: REHABILITATION | Facility: HOSPITAL | Age: 35
End: 2025-01-14
Payer: COMMERCIAL

## 2025-01-14 DIAGNOSIS — S83.512D SPRAIN OF ANTERIOR CRUCIATE LIGAMENT OF LEFT KNEE, SUBSEQUENT ENCOUNTER: Primary | ICD-10-CM

## 2025-01-14 DIAGNOSIS — R26.2 DIFFICULTY WALKING: ICD-10-CM

## 2025-01-14 PROCEDURE — 97112 NEUROMUSCULAR REEDUCATION: CPT | Mod: PN | Performed by: PHYSICAL THERAPIST

## 2025-01-14 PROCEDURE — 97110 THERAPEUTIC EXERCISES: CPT | Mod: PN | Performed by: PHYSICAL THERAPIST

## 2025-01-14 PROCEDURE — 97140 MANUAL THERAPY 1/> REGIONS: CPT | Mod: PN | Performed by: PHYSICAL THERAPIST

## 2025-01-14 NOTE — PROGRESS NOTES
"[facility]   Physical Therapy Treatment Note     Name: Freddy Villegas  [facility] Number: [id number]    Therapy Diagnosis:   Encounter Diagnoses   Name Primary?    Sprain of anterior cruciate ligament of left knee, subsequent encounter Yes    Difficulty walking      Physician: Gaetano Polk MD    Physician Orders: PT Eval and Treat   Medical Diagnosis from Referral: S83.512A (ICD-10-CM) - Rupture of anterior cruciate ligament of left knee, initial encounter Z98.890 (ICD-10-CM) - S/P ACL reconstruction  Evaluation Date: 1/14/2025  Authorization Period Expiration: 3/21/2025  Plan of Care Expiration: 3/3/2025  Visit # / Visits authorized: 6/12  #Visits based on PHYSICAL THERAPY POC: 26     Foto  Date  Score      Knee  Lower Score = Greater Disability   #1/3 1/3/2025 31.3   #2/3     #3/3       Time in  11:10am    Time out 12:30pm   Total Appointment Time 80 minutes      Precautions: Standard        Subjective     HOME EXERCISE PROGRAM  Reviewed, reports compliance    Response  No complaint of soreness following progression of exercises last visit    Current functional status  Now able to drive himself and plans on returning to work tomorrow    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, coaches football, teaches at school     Pain:      Current 7/10     Location L knee     Objective    1/14/2025:  Observation/posture: presents wearing brace locked at L knee/leg with B axillary crutches (crutches at a height too low for patient)    Gait: Partial weight bearing     Range of Motion (Degrees):   Knee Right  Left    Flexion 144 66 AAROM during heel slides    Extension 0 -3     Lower Extremity Strength       Knee           Right          Left     Extension: 5/5 tba   Flexion: 4+/5 tba     Function/balance:    Right  Left    Single leg stance  - 30"  tba   Squat tba  - -     Joint Mobility: " "   Right  Left    PFJ  WNL Impaired    Knee joint   WNL Impaired      Edema:    Joint line in cm 10 cm distal to tibial tuberosity in cm 5 cm above in cm   Right 39.0 36.5 40.0   Left  41.0 37.0 41.5     Treatment   + indicates new exercise   Bold indicates completed exercise    therapeutic exercises to develop strength, endurance, ROM, flexibility, posture, and core stabilization for 54 minutes:  Prone knee hang x 4'  Seated L knee ext stretch, x 10'  Seated L knee flex stretch off edge of mat, x 3'   L heel slide with board and strap (to 65 deg AAROM), 10"x10  L gastroc stretch with strap, 30"x+5  L soleus stretch with strap, bolster under knee, 30"x3   +L SLR flexion with brace locked in extension, 3x10  +L SLR abduction with brace locked in extension, 3x10   L hamstring isometric, 5" hold, 3x10    manual therapy techniques: Joint mobilizations, Manual traction, Myofacial release, Soft tissue Mobilization, and Friction Massage for 10 minutes:  L PFJ, knee joint (extension, flexion), Gr II/III/IV JM     neuromuscular re-education activities to improve: Balance, Coordination, Kinesthetic, Sense, Proprioception, and Posture for 10 minutes:  French current - L quad sets, 10" on/10" off x 10'    therapeutic activities to improve functional performance for 00 minutes:        gait training to improve functional mobility and safety for 6 minutes:  +25-50% WB with use of scale and B HRA for proprioceptive feedback  Forward/retro, 2x10  Medial/lateral, 2x10     Home Exercises and Patient Education Provided  Education provided:   - yes    Home Exercises Provided: yes.  Exercises were reviewed and Freddy was able to demonstrate them prior to the end of the session.  Freddy demonstrated good  understanding of the education provided.     Assessment   Patient obtains 66 deg L knee flex AAROM during heel slides today. Patient reports feeling like his knee gets stuck at 66 deg L knee flex and just can't go any further. SLR " into flex and abd initiated today with brace donned.     Pt presents with decreased ROM, muscle strength, flexibility, joint mobility. Increased pain, stiffness, soft tissue restriction. Impaired posture, joint mechanics, balance, gait pattern.     The patient's current task deficits include the following: limitation in ADL, self care, social/recreational tasks.     Patient prognosis: good  Rehab potential: good    Patient will benefit from skilled outpatient Physical Therapy to address the deficits stated above and in the chart below, provide patient /family education, to maximize patient's level of independence, and to address functional deficits.    Anticipated Barriers for therapy: chronicity of current injury and post operative nature of current condition     Goals:   Short Terms Goals: 6 weeks    Goal  Progress  Date    (1)  Patient will be I with HOME EXERCISE PROGRAM  Initial, Not Met 1/14/2025     (2)  Patient will obtain +3 deg L knee ext ACTIVE RANGE OF MOTION to indicate improved ability to stand, > 60 minutes   Initial, Not Met  1/14/2025     (3)   Patient will obtain > 120 deg L knee flex ACTIVE RANGE OF MOTION to indicate improved sitting tolerance, > 60 minutes    Initial, Not Met  1/14/2025       Long Term Goals: 12 weeks    Goal  Progress  Date    (1)  Patient will obtain 5/5 L knee ext MMT to indicate improved ability to walk, > 60 minutes  Initial, Not Met  1/14/2025   (2)   Patient will obtain 5/5 L knee flex MMT to indicate improved ability to negotiate flight of stairs, alternating gait pattern, no HRA   Initial, Not Met 1/14/2025    (3)   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    Outpatient Physical Therapy 3 times weekly for 2 weeks, then 2 times weekly for 10 weeks to include the following interventions: Cervical/Lumbar Traction, Electrical  Stimulation re-eval, dry needling, Gait Training, Iontophoresis (with ), Manual Therapy, Moist Heat/ Ice, Neuromuscular Re-ed, Patient Education, Self Care, Therapeutic Activities, and Therapeutic Exercise.     Physical therapist and physical therapy assistant(s) will met face to face to discuss patient's treatment plan and progress towards established goals. Pt will be seen by a physical therapist minimally every 6th visit or every 30 days.    Stacie Diza, PT, DPT

## 2025-01-16 ENCOUNTER — OFFICE VISIT (OUTPATIENT)
Dept: ORTHOPEDICS | Facility: CLINIC | Age: 35
End: 2025-01-16
Payer: COMMERCIAL

## 2025-01-16 DIAGNOSIS — Z98.890 S/P ACL RECONSTRUCTION: ICD-10-CM

## 2025-01-16 DIAGNOSIS — S83.512A RUPTURE OF ANTERIOR CRUCIATE LIGAMENT OF LEFT KNEE, INITIAL ENCOUNTER: Primary | ICD-10-CM

## 2025-01-16 PROCEDURE — 1159F MED LIST DOCD IN RCRD: CPT | Mod: CPTII,S$GLB,, | Performed by: ORTHOPAEDIC SURGERY

## 2025-01-16 PROCEDURE — 99024 POSTOP FOLLOW-UP VISIT: CPT | Mod: S$GLB,,, | Performed by: ORTHOPAEDIC SURGERY

## 2025-01-16 PROCEDURE — 99999 PR PBB SHADOW E&M-EST. PATIENT-LVL II: CPT | Mod: PBBFAC,,, | Performed by: ORTHOPAEDIC SURGERY

## 2025-01-16 NOTE — PROGRESS NOTES
Two weeks out from ACL reconstruction with allograft doing well     No signs infection sutures removed     Plan: Continue with therapy, follow up in a month's time as a postop visit with x-rays of his left knee

## 2025-01-17 ENCOUNTER — CLINICAL SUPPORT (OUTPATIENT)
Dept: REHABILITATION | Facility: HOSPITAL | Age: 35
End: 2025-01-17
Payer: COMMERCIAL

## 2025-01-17 DIAGNOSIS — S83.512D SPRAIN OF ANTERIOR CRUCIATE LIGAMENT OF LEFT KNEE, SUBSEQUENT ENCOUNTER: Primary | ICD-10-CM

## 2025-01-17 DIAGNOSIS — R26.2 DIFFICULTY WALKING: ICD-10-CM

## 2025-01-17 PROCEDURE — 97112 NEUROMUSCULAR REEDUCATION: CPT | Mod: PN | Performed by: PHYSICAL THERAPIST

## 2025-01-17 PROCEDURE — 97116 GAIT TRAINING THERAPY: CPT | Mod: PN | Performed by: PHYSICAL THERAPIST

## 2025-01-17 PROCEDURE — 97110 THERAPEUTIC EXERCISES: CPT | Mod: PN | Performed by: PHYSICAL THERAPIST

## 2025-01-17 PROCEDURE — 97140 MANUAL THERAPY 1/> REGIONS: CPT | Mod: PN | Performed by: PHYSICAL THERAPIST

## 2025-01-17 NOTE — PROGRESS NOTES
[facility]   Physical Therapy Treatment Note     Name: Freddy Villegas  [facility] Number: [id number]    Therapy Diagnosis:   Encounter Diagnoses   Name Primary?    Sprain of anterior cruciate ligament of left knee, subsequent encounter Yes    Difficulty walking      Physician: Gaetano Polk MD    Physician Orders: PT Eval and Treat   Medical Diagnosis from Referral: S83.512A (ICD-10-CM) - Rupture of anterior cruciate ligament of left knee, initial encounter Z98.890 (ICD-10-CM) - S/P ACL reconstruction  Evaluation Date: 1/17/2025  Authorization Period Expiration: 3/21/2025  Plan of Care Expiration: 3/3/2025  Visit # / Visits authorized: 7/12  #Visits based on PHYSICAL THERAPY POC: 26     Foto  Date  Score      Knee  Lower Score = Greater Disability   #1/3 1/3/2025 31.3   #2/3 1/17/2025 52.5   #3/3       Time in  1:08pm   Time out 2:39pm   Total Appointment Time 91 minutes          Precautions: Standard        Subjective     HOME EXERCISE PROGRAM  Reviewed, reports compliance    Response  No complaint of soreness following progression of exercises last visit    Current functional status  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, coaches football, teaches at school     Pain:      Current 7/10     Location L knee     Objective    1/17/2025:  Observation/posture: presents wearing brace locked at L knee/leg with B axillary crutches (crutches at a height too low for patient)    Gait: Partial weight bearing     Range of Motion (Degrees):   Knee Right  Left  Left    Flexion 144 66 AAROM during heel slides  69 AAROM during heel slides    Extension 0 -3 -2     Lower Extremity Strength       Knee           Right          Left     Extension: 5/5 tba   Flexion: 4+/5 tba     Function/balance:    Right  Left    Single leg stance   "- 30"  tba   Squat tba  - -     Joint Mobility:    Right  Left    PFJ  WNL Impaired    Knee joint   WNL Impaired      Edema:    Joint line in cm 10 cm distal to tibial tuberosity in cm 5 cm above in cm   Right 39.0 36.5 40.0   Left  41.0 37.0 41.5   Left  41.0 37.3 40.0     Treatment   + indicates new exercise   Bold indicates completed exercise    therapeutic exercises to develop strength, endurance, ROM, flexibility, posture, and core stabilization for 63 minutes:  Prone knee hang x 4'  Seated L knee ext stretch, x 10'  Seated L knee flex stretch off edge of mat, x 5'   2' with use of #4 at L ankle   L heel slide with board and strap, 10"x10  L hamstring isometric, 5" hold, 3x10  L gastroc stretch with strap, 30"x5  L soleus stretch with strap, bolster under knee, 30"x5  L SLR flexion with brace locked in extension, with assist from stretch strap, 3x10  L SLR abduction with brace locked in extension, 3x10     manual therapy techniques: Joint mobilizations, Manual traction, Myofacial release, Soft tissue Mobilization, and Friction Massage for 10 minutes:  L PFJ, knee joint (extension, flexion), Gr II/III/IV JM     neuromuscular re-education activities to improve: Balance, Coordination, Kinesthetic, Sense, Proprioception, and Posture for 10 minutes:  Danish current - L quad sets, 10" on/10" off x 10'    therapeutic activities to improve functional performance for 00 minutes:        gait training to improve functional mobility and safety for 8 minutes:  25-50% WB with use of scale and B HRA for proprioceptive feedback  Forward/retro, 2x10  Medial/lateral, 2x10     Home Exercises and Patient Education Provided  Education provided:   - yes    Home Exercises Provided: yes.  Exercises were reviewed and Freddy was able to demonstrate them prior to the end of the session.  Freddy demonstrated good  understanding of the education provided.     Assessment   Weight shifting initiated using scale for proprioceptive feedback " with toe touch WB to partial WB as tolerated; no adverse s/s.     Pt presents with decreased ROM, muscle strength, flexibility, joint mobility. Increased pain, stiffness, soft tissue restriction. Impaired posture, joint mechanics, balance, gait pattern.     The patient's current task deficits include the following: limitation in ADL, self care, social/recreational tasks.     Patient prognosis: good  Rehab potential: good    Patient will benefit from skilled outpatient Physical Therapy to address the deficits stated above and in the chart below, provide patient /family education, to maximize patient's level of independence, and to address functional deficits.    Anticipated Barriers for therapy: chronicity of current injury and post operative nature of current condition     Goals:   Short Terms Goals: 6 weeks    Goal  Progress  Date    (1)  Patient will be I with HOME EXERCISE PROGRAM  Initial, Not Met 1/17/2025     (2)  Patient will obtain +3 deg L knee ext ACTIVE RANGE OF MOTION to indicate improved ability to stand, > 60 minutes   Initial, Not Met  1/17/2025     (3)   Patient will obtain > 120 deg L knee flex ACTIVE RANGE OF MOTION to indicate improved sitting tolerance, > 60 minutes    Initial, Not Met  1/17/2025       Long Term Goals: 12 weeks    Goal  Progress  Date    (1)  Patient will obtain 5/5 L knee ext MMT to indicate improved ability to walk, > 60 minutes  Initial, Not Met  1/17/2025   (2)   Patient will obtain 5/5 L knee flex MMT to indicate improved ability to negotiate flight of stairs, alternating gait pattern, no HRA   Initial, Not Met 1/17/2025    (3)   Patient 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    Outpatient Physical Therapy 3 times weekly for 2 weeks, then 2 times weekly for 10 weeks to include the following interventions: Cervical/Lumbar Traction, Electrical  Stimulation re-eval, dry needling, Gait Training, Iontophoresis (with ), Manual Therapy, Moist Heat/ Ice, Neuromuscular Re-ed, Patient Education, Self Care, Therapeutic Activities, and Therapeutic Exercise.     Physical therapist and physical therapy assistant(s) will met face to face to discuss patient's treatment plan and progress towards established goals. Pt will be seen by a physical therapist minimally every 6th visit or every 30 days.    Stacie Diaz, PT, DPT

## 2025-01-24 ENCOUNTER — CLINICAL SUPPORT (OUTPATIENT)
Dept: REHABILITATION | Facility: HOSPITAL | Age: 35
End: 2025-01-24
Payer: COMMERCIAL

## 2025-01-24 DIAGNOSIS — S83.512D SPRAIN OF ANTERIOR CRUCIATE LIGAMENT OF LEFT KNEE, SUBSEQUENT ENCOUNTER: Primary | ICD-10-CM

## 2025-01-24 DIAGNOSIS — R26.2 DIFFICULTY WALKING: ICD-10-CM

## 2025-01-24 PROCEDURE — 97140 MANUAL THERAPY 1/> REGIONS: CPT | Mod: PN | Performed by: PHYSICAL THERAPIST

## 2025-01-24 PROCEDURE — 97110 THERAPEUTIC EXERCISES: CPT | Mod: PN | Performed by: PHYSICAL THERAPIST

## 2025-01-24 NOTE — PROGRESS NOTES
[facility]   Physical Therapy Treatment Note     Name: Freddy Villegas  [facility] Number: [id number]    Therapy Diagnosis:   Encounter Diagnoses   Name Primary?    Sprain of anterior cruciate ligament of left knee, subsequent encounter Yes    Difficulty walking      Physician: Gaetano Polk MD    Physician Orders: PT Eval and Treat   Medical Diagnosis from Referral: S83.512A (ICD-10-CM) - Rupture of anterior cruciate ligament of left knee, initial encounter Z98.890 (ICD-10-CM) - S/P ACL reconstruction  Evaluation Date: 1/24/2025  Authorization Period Expiration: 3/21/2025  Plan of Care Expiration: 3/3/2025  Visit # / Visits authorized: 8/12  #Visits based on PHYSICAL THERAPY POC: 26     Foto  Date  Score      Knee  Lower Score = Greater Disability   #1/3 1/3/2025 31.3   #2/3 1/17/2025 52.5   #3/3       Time in  2:05pm   Time out 3:25pm   Total Appointment Time 80 minutes          Precautions: Standard        DOS: 12/31/2024  Subjective     HOME EXERCISE PROGRAM  Reviewed, reports compliance    Response  Feels like he is able to move his leg better with each day    Current functional status  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, coaches football, teaches at school     Pain:      Current 7/10     Location L knee     Objective    1/24/2025:  Observation/posture: presents wearing brace locked at L knee/leg with B axillary crutches (crutches at a height too low for patient)    Gait: Partial weight bearing     Range of Motion (Degrees):   Knee Right  Left  Left  Left    Flexion 144 66 AAROM during heel slides  69 AAROM during heel slides     Extension 0 -3 -2 -2     Lower Extremity Strength       Knee           Right          Left     Extension: 5/5 tba   Flexion: 4+/5 tba     Function/balance:    Right  Left   "  Single leg stance  - 30"  tba   Squat tba  - -     Joint Mobility:    Right  Left    PFJ  WNL Impaired    Knee joint   WNL Impaired      Edema:    Joint line in cm 10 cm distal to tibial tuberosity in cm 5 cm above in cm   Right 39.0 36.5 40.0   Left  41.0 37.0 41.5   Left  41.0 37.3 40.0     Treatment   + indicates new exercise   Bold indicates completed exercise    therapeutic exercises to develop strength, endurance, ROM, flexibility, posture, and core stabilization for 50 minutes:  +Prone knee flex stretch, with strap, 10"x10   Initiate next visit- L hip flex stretch, LE off edge of mat, 30"x5   Seated L knee flex stretch off edge of mat, x 5'   2' with use of #4 at L ankle   L heel slide with board and strap, 10"x10  L hamstring isometric, 5" hold, 3x10  L gastroc stretch with strap, 30"x5  L soleus stretch with strap, bolster under knee, 30"x5  L SLR flexion with brace locked in extension, with PHYSICAL THERAPY assist, 3x10  L SLR abduction with brace locked in extension, 3x10   +L SLR extension with brace locked in extension, 3x10   +L SLR adduction with brace locked in extension, 3x10  +Written HOME EXERCISE PROGRAM updated, reviewed, patient demo understanding     manual therapy techniques: Joint mobilizations, Manual traction, Myofacial release, Soft tissue Mobilization, and Friction Massage for 20 minutes:  L PFJ, knee joint (extension, flexion), Gr II/III/IV JM   +L distal quads decompression cupping     neuromuscular re-education activities to improve: Balance, Coordination, Kinesthetic, Sense, Proprioception, and Posture for 00 minutes:  Georgian current - L quad sets, 10" on/10" off x 10'    therapeutic activities to improve functional performance for 4 minutes:  +Upright bike, half revolutions, 10" hold each way for flexion, x4'       gait training to improve functional mobility and safety for 6 minutes:  50 -75% WB (93 - 139 lbs) with use of scale and B HRA for proprioceptive " feedback  Forward/retro, 2x10  Medial/lateral, 2x10     Home Exercises and Patient Education Provided  Education provided:   - yes    Home Exercises Provided: yes.  Exercises were reviewed and Freddy was able to demonstrate them prior to the end of the session.  Freddy demonstrated good  understanding of the education provided.     Assessment   Emphasis on manual intervention facilitating flex/ext at L knee, knee flex exercises, and upright bike initiated for reciprocal ROM. Patient reports L knee feeling better afterwards; however, he continues with a sharp pain at distal quads attachment before obtaining 90 deg knee flexion.     Pt presents with decreased ROM, muscle strength, flexibility, joint mobility. Increased pain, stiffness, soft tissue restriction. Impaired posture, joint mechanics, balance, gait pattern.     The patient's current task deficits include the following: limitation in ADL, self care, social/recreational tasks.     Patient prognosis: good  Rehab potential: good    Patient will benefit from skilled outpatient Physical Therapy to address the deficits stated above and in the chart below, provide patient /family education, to maximize patient's level of independence, and to address functional deficits.    Anticipated Barriers for therapy: chronicity of current injury and post operative nature of current condition     Goals:   Short Terms Goals: 6 weeks    Goal  Progress  Date    (1)  Patient will be I with HOME EXERCISE PROGRAM  Initial, Not Met 1/24/2025     (2)  Patient will obtain +3 deg L knee ext ACTIVE RANGE OF MOTION to indicate improved ability to stand, > 60 minutes   Initial, Not Met  1/24/2025     (3)   Patient will obtain > 120 deg L knee flex ACTIVE RANGE OF MOTION to indicate improved sitting tolerance, > 60 minutes    Initial, Not Met  1/24/2025       Long Term Goals: 12 weeks    Goal  Progress  Date    (1)  Patient will obtain 5/5 L knee ext MMT to indicate improved ability to  walk, > 60 minutes  Initial, Not Met  1/24/2025   (2)   Patient will obtain 5/5 L knee flex MMT to indicate improved ability to negotiate flight of stairs, alternating gait pattern, no HRA   Initial, Not Met 1/24/2025    (3)   Patient will 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    Outpatient Physical Therapy 3 times weekly for 2 weeks, then 2 times weekly for 10 weeks to include the following interventions: Cervical/Lumbar Traction, Electrical Stimulation re-eval, dry needling, Gait Training, Iontophoresis (with ), Manual Therapy, Moist Heat/ Ice, Neuromuscular Re-ed, Patient Education, Self Care, Therapeutic Activities, and Therapeutic Exercise.     Physical therapist and physical therapy assistant(s) will met face to face to discuss patient's treatment plan and progress towards established goals. Pt will be seen by a physical therapist minimally every 6th visit or every 30 days.    Stacie Diaz, PT, DPT

## 2025-01-28 ENCOUNTER — CLINICAL SUPPORT (OUTPATIENT)
Dept: REHABILITATION | Facility: HOSPITAL | Age: 35
End: 2025-01-28
Payer: COMMERCIAL

## 2025-01-28 DIAGNOSIS — R26.2 DIFFICULTY WALKING: ICD-10-CM

## 2025-01-28 DIAGNOSIS — S83.512D SPRAIN OF ANTERIOR CRUCIATE LIGAMENT OF LEFT KNEE, SUBSEQUENT ENCOUNTER: Primary | ICD-10-CM

## 2025-01-28 PROCEDURE — 97110 THERAPEUTIC EXERCISES: CPT | Mod: PN | Performed by: PHYSICAL THERAPIST

## 2025-01-28 PROCEDURE — 97530 THERAPEUTIC ACTIVITIES: CPT | Mod: PN | Performed by: PHYSICAL THERAPIST

## 2025-01-28 PROCEDURE — 97140 MANUAL THERAPY 1/> REGIONS: CPT | Mod: PN | Performed by: PHYSICAL THERAPIST

## 2025-01-28 NOTE — PROGRESS NOTES
[facility]   Physical Therapy Treatment Note     Name: Freddy Villegas  [facility] Number: [id number]    Therapy Diagnosis:   Encounter Diagnoses   Name Primary?    Sprain of anterior cruciate ligament of left knee, subsequent encounter Yes    Difficulty walking      Physician: Gaetano Polk MD    Physician Orders: PT Eval and Treat   Medical Diagnosis from Referral: S83.512A (ICD-10-CM) - Rupture of anterior cruciate ligament of left knee, initial encounter Z98.890 (ICD-10-CM) - S/P ACL reconstruction  Evaluation Date: 1/28/2025  Authorization Period Expiration: 3/21/2025  Plan of Care Expiration: 3/3/2025  Visit # / Visits authorized: 9/12  #Visits based on PHYSICAL THERAPY POC: 26     Foto  Date  Score      Knee  Lower Score = Greater Disability   #1/3 1/3/2025 31.3   #2/3 1/17/2025 52.5   #3/3       Time in  2:00pm   Time out 3:08pm   Total Appointment Time 68 minutes          Precautions: Standard        DOS: 12/31/2024  Subjective     HOME EXERCISE PROGRAM  Reviewed, reports compliance    Response  Still feeling pain at area of L patella and distal quads with knee flex. 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, coaches football, teaches at school     Pain:      Current 7/10     Location L knee     Objective    1/28/2025:  Observation/posture: presents wearing brace locked at L knee/leg with B axillary crutches (crutches at a height too low for patient)    Gait: Partial weight bearing     Range of Motion (Degrees):   Knee Right  Left  Left  Left    Flexion 144 66 AAROM during heel slides  69 AAROM during heel slides  80   Extension 0 -3 -2      Lower Extremity Strength       Knee           Right           "Left     Extension: 5/5 tba   Flexion: 4+/5 tba     Function/balance:    Right  Left    Single leg stance  - 30"  tba   Squat tba  - -     Joint Mobility:    Right  Left    PFJ  WNL Impaired    Knee joint   WNL Impaired      Edema:    Joint line in cm 10 cm distal to tibial tuberosity in cm 5 cm above in cm   Right 39.0 36.5 40.0   Left  41.0 37.0 41.5   Left  41.0 37.3 40.0     Treatment   + indicates new exercise   Bold indicates completed exercise    therapeutic exercises to develop strength, endurance, ROM, flexibility, posture, and core stabilization for 42 minutes:  Prone knee flex stretch, with strap, 10"x10   Initiate next visit- L hip flex stretch, LE off edge of mat, 30"x5   Seated L knee flex stretch off edge of mat, x 5'   2' with use of #4 at L ankle   L heel slide with board and strap, 10"x10  L hamstring isometric, 5" hold, 3x10  L gastroc stretch with strap, 30"x5  L soleus stretch with strap, bolster under knee, 30"x5  +Verbal review of  HOME EXERCISE PROGRAM   L SLR flexion with brace locked in extension, with PHYSICAL THERAPY assist, 3x10  L SLR abduction with brace locked in extension, 3x10   L SLR extension with brace locked in extension, 3x10   L SLR adduction with brace locked in extension, 3x10    manual therapy techniques: Joint mobilizations, Manual traction, Myofacial release, Soft tissue Mobilization, and Friction Massage for 18 minutes:  L PFJ, knee joint (extension, flexion), Gr II/III/IV JM   L distal quads decompression cupping     neuromuscular re-education activities to improve: Balance, Coordination, Kinesthetic, Sense, Proprioception, and Posture for 00 minutes:  Guatemalan current - L quad sets, 10" on/10" off x 10'    therapeutic activities to improve functional performance for 8 minutes:  Upright bike, half revolutions, 10" hold each way for flexion, x4'       gait training to improve functional mobility and safety for 00 minutes:  50 -75% WB (93 - 139 lbs) with use of scale and B " HRA for proprioceptive feedback  Forward/retro, 2x10  Medial/lateral, 2x10     Home Exercises and Patient Education Provided  Education provided:   - yes    Home Exercises Provided: yes.  Exercises were reviewed and Freddy was able to demonstrate them prior to the end of the session.  Freddy demonstrated good  understanding of the education provided.     Assessment   Patient ambulates into clinic without crutches and with brace locked in extension today. He continues to exhibits impaired quads activation indicating use of brace locked in extension for community ambulation being warranted at this time. He is able to obtain 80 deg L knee flex today, but feels as though something is stopping his knee from moving beyond that point along with deep intense pain at area of distal quads and superior patella.     Pt presents with decreased ROM, muscle strength, flexibility, joint mobility. Increased pain, stiffness, soft tissue restriction. Impaired posture, joint mechanics, balance, gait pattern.     The patient's current task deficits include the following: limitation in ADL, self care, social/recreational tasks.     Patient prognosis: good  Rehab potential: good    Patient will benefit from skilled outpatient Physical Therapy to address the deficits stated above and in the chart below, provide patient /family education, to maximize patient's level of independence, and to address functional deficits.    Anticipated Barriers for therapy: chronicity of current injury and post operative nature of current condition     Goals:   Short Terms Goals: 6 weeks    Goal  Progress  Date    (1)  Patient will be I with HOME EXERCISE PROGRAM  Progressing, Not Met 1/28/2025     (2)  Patient will obtain +3 deg L knee ext ACTIVE RANGE OF MOTION to indicate improved ability to stand, > 60 minutes  Progressing, Not Met  1/28/2025     (3)   Patient will obtain > 120 deg L knee flex ACTIVE RANGE OF MOTION to indicate improved sitting  tolerance, > 60 minutes    Progressing, Not Met  1/28/2025       Long Term Goals: 12 weeks    Goal  Progress  Date    (1)  Patient will obtain 5/5 L knee ext MMT to indicate improved ability to walk, > 60 minutes  Progressing, Not Met  1/28/2025   (2)   Patient will obtain 5/5 L knee flex MMT to indicate improved ability to negotiate flight of stairs, alternating gait pattern, no HRA   Progressing, Not Met 1/28/2025    (3)   Patient will return to previous level of function for participation in coaching tasks  Progressing, Not Met  1/28/2025       Plan     Plan of care Certification: 1/28/2025 to 3/3/2025.    Outpatient Physical Therapy 3 times weekly for 2 weeks, then 2 times weekly for 10 weeks to include the following interventions: Cervical/Lumbar Traction, Electrical Stimulation re-eval, dry needling, Gait Training, Iontophoresis (with ), Manual Therapy, Moist Heat/ Ice, Neuromuscular Re-ed, Patient Education, Self Care, Therapeutic Activities, and Therapeutic Exercise.     Physical therapist and physical therapy assistant(s) will met face to face to discuss patient's treatment plan and progress towards established goals. Pt will be seen by a physical therapist minimally every 6th visit or every 30 days.    Stacie Diaz, PT, DPT

## 2025-01-30 ENCOUNTER — CLINICAL SUPPORT (OUTPATIENT)
Dept: REHABILITATION | Facility: HOSPITAL | Age: 35
End: 2025-01-30
Payer: COMMERCIAL

## 2025-01-30 DIAGNOSIS — R26.2 DIFFICULTY WALKING: ICD-10-CM

## 2025-01-30 DIAGNOSIS — S83.512D SPRAIN OF ANTERIOR CRUCIATE LIGAMENT OF LEFT KNEE, SUBSEQUENT ENCOUNTER: Primary | ICD-10-CM

## 2025-01-30 PROCEDURE — 97116 GAIT TRAINING THERAPY: CPT | Mod: PN | Performed by: PHYSICAL THERAPIST

## 2025-01-30 PROCEDURE — 97140 MANUAL THERAPY 1/> REGIONS: CPT | Mod: PN | Performed by: PHYSICAL THERAPIST

## 2025-01-30 PROCEDURE — 97110 THERAPEUTIC EXERCISES: CPT | Mod: PN | Performed by: PHYSICAL THERAPIST

## 2025-01-30 PROCEDURE — 97530 THERAPEUTIC ACTIVITIES: CPT | Mod: PN | Performed by: PHYSICAL THERAPIST

## 2025-01-30 NOTE — PROGRESS NOTES
[facility]   Physical Therapy Treatment Note     Name: Freddy Villegas  [facility] Number: [id number]    Therapy Diagnosis:   Encounter Diagnoses   Name Primary?    Sprain of anterior cruciate ligament of left knee, subsequent encounter Yes    Difficulty walking      Physician: Gaetano Polk MD    Physician Orders: PT Eval and Treat   Medical Diagnosis from Referral: S83.512A (ICD-10-CM) - Rupture of anterior cruciate ligament of left knee, initial encounter Z98.890 (ICD-10-CM) - S/P ACL reconstruction  Evaluation Date: 1/30/2025  Authorization Period Expiration: 3/21/2025  Plan of Care Expiration: 3/3/2025  Visit # / Visits authorized: 10/12  #Visits based on PHYSICAL THERAPY POC: 26     Foto  Date  Score      Knee  Lower Score = Greater Disability   #1/3 1/3/2025 31.3   #2/3 1/17/2025 52.5   #3/3       Time in  2:08pm   Time out 3:26pm    Total Appointment Time 78 minutes          Precautions: Standard        DOS: 12/31/2024  Subjective     HOME EXERCISE PROGRAM  Reviewed, reports compliance    Response  Still feeling pain at area of L patella and distal quads with knee flex. 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, coaches football, teaches at school     Pain:      Current 7/10     Location L knee     Objective    1/28/2025:  Observation/posture: presents wearing brace locked at L knee/leg with B axillary crutches (crutches at a height too low for patient)    Gait: Partial weight bearing     Range of Motion (Degrees):   Knee Right  Left  Left  Left    Flexion 144 66 AAROM during heel slides  69 AAROM during heel slides  80   Extension 0 -3 -2      Lower Extremity Strength       Knee           Right           "Left     Extension: 5/5 tba   Flexion: 4+/5 tba     Function/balance:    Right  Left    Single leg stance  - 30"  tba   Squat tba  - -     Joint Mobility:    Right  Left    PFJ  WNL Impaired    Knee joint   WNL Impaired      Edema:    Joint line in cm 10 cm distal to tibial tuberosity in cm 5 cm above in cm   Right 39.0 36.5 40.0   Left  41.0 37.0 41.5   Left  41.0 37.3 40.0     Treatment   + indicates new exercise   Bold indicates completed exercise    therapeutic exercises to develop strength, endurance, ROM, flexibility, posture, and core stabilization for 35 minutes:  Prone knee flex stretch, with strap, 10"x10   Initiate next visit- L hip flex stretch, LE off edge of mat, 30"x5   Seated L knee flex stretch off edge of mat, x 5'   2' with use of #4 at L ankle   L heel slide with board and strap, 10"x10  +L hamstring stretch with heel on step, 30"x3   L gastroc stretch, +incline board, 30"x3   L soleus stretch with strap, bolster under knee, 30"x5  +Heel raises, B HRA, 3x10  +Pilates machine for knee flex, x 20   +B heel raises,  B HRA, 3x10  L SLR flexion with brace locked in extension, with PHYSICAL THERAPY assist, 3x10  L SLR abduction with brace locked in extension, 3x10   L SLR extension with brace locked in extension, 3x10   L SLR adduction with brace locked in extension, 3x10    manual therapy techniques: Joint mobilizations, Manual traction, Myofacial release, Soft tissue Mobilization, and Friction Massage for 18 minutes:  L PFJ, knee joint (extension, flexion), Gr II/III/IV JM   L distal quads decompression cupping     neuromuscular re-education activities to improve: Balance, Coordination, Kinesthetic, Sense, Proprioception, and Posture for 00 minutes:  Danish current - L quad sets, 10" on/10" off x 10'    therapeutic activities to improve functional performance for 10 minutes:  Upright bike, half revolutions, 10" hold each way for flexion, x4'       gait training to improve functional mobility and " safety for 15 minutes:  Weight shifting  Forward/retro, 2x10  Medial/lateral, 2x10     Home Exercises and Patient Education Provided  Education provided:   - yes    Home Exercises Provided: yes.  Exercises were reviewed and Freddy was able to demonstrate them prior to the end of the session.  Freddy demonstrated good  understanding of the education provided.     Assessment   Patient continues with limitation primarily in knee flex (L) due to pain at quads tendon and stiffness. Weight shifting emphasized today and closed chain tasks progressed as patient is now 4 weeks post op.     Pt presents with decreased ROM, muscle strength, flexibility, joint mobility. Increased pain, stiffness, soft tissue restriction. Impaired posture, joint mechanics, balance, gait pattern.     The patient's current task deficits include the following: limitation in ADL, self care, social/recreational tasks.     Patient prognosis: good  Rehab potential: good    Patient will benefit from skilled outpatient Physical Therapy to address the deficits stated above and in the chart below, provide patient /family education, to maximize patient's level of independence, and to address functional deficits.    Anticipated Barriers for therapy: chronicity of current injury and post operative nature of current condition     Goals:   Short Terms Goals: 6 weeks    Goal  Progress  Date    (1)  Patient will be I with HOME EXERCISE PROGRAM  Progressing, Not Met 1/30/2025     (2)  Patient will obtain +3 deg L knee ext ACTIVE RANGE OF MOTION to indicate improved ability to stand, > 60 minutes  Progressing, Not Met  1/30/2025     (3)   Patient will obtain > 120 deg L knee flex ACTIVE RANGE OF MOTION to indicate improved sitting tolerance, > 60 minutes    Progressing, Not Met  1/30/2025       Long Term Goals: 12 weeks    Goal  Progress  Date    (1)  Patient will obtain 5/5 L knee ext MMT to indicate improved ability to walk, > 60 minutes  Progressing, Not Met   1/30/2025   (2)   Patient will obtain 5/5 L knee flex MMT to indicate improved ability to negotiate flight of stairs, alternating gait pattern, no HRA   Progressing, Not Met 1/30/2025    (3)   Patient 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    Outpatient Physical Therapy 3 times weekly for 2 weeks, then 2 times weekly for 10 weeks to include the following interventions: Cervical/Lumbar Traction, Electrical Stimulation re-eval, dry needling, Gait Training, Iontophoresis (with ), Manual Therapy, Moist Heat/ Ice, Neuromuscular Re-ed, Patient Education, Self Care, Therapeutic Activities, and Therapeutic Exercise.     Physical therapist and physical therapy assistant(s) will met face to face to discuss patient's treatment plan and progress towards established goals. Pt will be seen by a physical therapist minimally every 6th visit or every 30 days.    Stacie Diaz, PT, DPT

## 2025-02-04 ENCOUNTER — CLINICAL SUPPORT (OUTPATIENT)
Dept: REHABILITATION | Facility: HOSPITAL | Age: 35
End: 2025-02-04
Payer: COMMERCIAL

## 2025-02-04 DIAGNOSIS — S83.512D SPRAIN OF ANTERIOR CRUCIATE LIGAMENT OF LEFT KNEE, SUBSEQUENT ENCOUNTER: Primary | ICD-10-CM

## 2025-02-04 DIAGNOSIS — R26.2 DIFFICULTY WALKING: ICD-10-CM

## 2025-02-04 PROCEDURE — 97112 NEUROMUSCULAR REEDUCATION: CPT | Mod: PN,CQ

## 2025-02-04 PROCEDURE — 97110 THERAPEUTIC EXERCISES: CPT | Mod: PN,CQ

## 2025-02-04 PROCEDURE — 97116 GAIT TRAINING THERAPY: CPT | Mod: PN,CQ

## 2025-02-04 PROCEDURE — 97140 MANUAL THERAPY 1/> REGIONS: CPT | Mod: PN,CQ

## 2025-02-04 NOTE — PROGRESS NOTES
[facility]   Physical Therapy Treatment Note     Name: Freddy Villegas  [facility] Number: [id number]    Therapy Diagnosis:   Encounter Diagnoses   Name Primary?    Sprain of anterior cruciate ligament of left knee, subsequent encounter Yes    Difficulty walking      Physician: Gaetano Polk MD    Physician Orders: PT Eval and Treat   Medical Diagnosis from Referral: S83.512A (ICD-10-CM) - Rupture of anterior cruciate ligament of left knee, initial encounter Z98.890 (ICD-10-CM) - S/P ACL reconstruction  Evaluation Date: 2/4/2025  Authorization Period Expiration: 3/21/2025  Plan of Care Expiration: 3/3/2025  Visit # / Visits authorized: 10/12  #Visits based on PHYSICAL THERAPY POC: 26     Foto  Date  Score      Knee  Lower Score = Greater Disability   #1/3 1/3/2025 31.3   #2/3 1/17/2025 52.5   #3/3       Time in  2:00 pm   Time out 3:05 pm    Total Appointment Time 65 minutes          Precautions: Standard        DOS: 12/31/2024  Subjective     HOME EXERCISE PROGRAM  Reviewed, reports compliance    Response  Knee feels tight along anterior knee, distal quad and posterior knee with bending. Wearing brace less frequently, primarily with prolonged standing/walking activity.   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, coaches football, teaches at school     Pain:      Current 5/10     Location L knee     Objective    2/4/2025:  Observation/posture: presents wearing brace locked at L knee/leg with B axillary crutches (crutches at a height too low for patient)    Gait: Partial weight bearing     Range of Motion (Degrees):   Knee Right  Left  Left  Left    Flexion 144 66 AAROM during heel slides  69 AAROM during heel slides  93   Extension 0 -3 -2      Lower Extremity  "Strength       Knee           Right          Left     Extension: 5/5 tba   Flexion: 4+/5 tba     Function/balance:    Right  Left    Single leg stance  - 30"  tba   Squat tba  - -     Joint Mobility:    Right  Left    PFJ  WNL Impaired    Knee joint   WNL Impaired      Edema:    Joint line in cm 10 cm distal to tibial tuberosity in cm 5 cm above in cm   Right 39.0 36.5 40.0   Left  41.0 37.0 41.5   Left  41.0 37.3 40.0     Treatment   + indicates new exercise   Bold indicates completed exercise    therapeutic exercises to develop strength, endurance, ROM, flexibility, posture, and core stabilization for 25 minutes:  Prone knee flex stretch, with strap, 10"x10   Initiate next visit- L hip flex stretch, LE off edge of mat, 30"x5   Seated L knee flex stretch off edge of mat, overpressure with 10" holds from contralateral heel x 3'   2' with use of #4 at L ankle   L heel slide with board and strap, 10"x10  L hamstring stretch with heel on step, 30"x3   Knee flexion stretch with foot on 2nd step 10 x 10"  L gastroc stretch, +incline board, 30"x3   L soleus stretch with strap, bolster under knee, 30"x5  +Pilates machine for knee flex, x 20   B heel raises,  B HRA, 3x10  L SLR flexion with brace locked in extension, with PHYSICAL THERAPY assist, 3x10  L SLR abduction with brace locked in extension, 3x10   L SLR extension with brace locked in extension, 3x10   L SLR adduction with brace locked in extension, 3x10    manual therapy techniques: Joint mobilizations, Manual traction, Myofacial release, Soft tissue Mobilization, and Friction Massage for 20 minutes:  L PFJ, knee joint (extension, flexion), Gr II/III/IV JM   L distal quads IASTM with large S tool for fascial release  Scar mobilization    neuromuscular re-education activities to improve: Balance, Coordination, Kinesthetic, Sense, Proprioception, and Posture for 00 minutes:  Romanian current - L quad sets, 10" on/10" off x 10'    therapeutic activities to improve " "functional performance for 10 minutes:  Upright bike, half revolutions, 10" hold each way for flexion, x5'   Shuttle press B LE 50# 3 x 10 - self progression of knee flexion as tolerated      gait training to improve functional mobility and safety for 10 minutes:  Weight shifting  Forward/retro, 2x10 over low orange gone  Medial/lateral, 2x10  Ambulation in clinic x 80 feet with cue for "stepping through high grass" for proper foot clearance and knee/hip flexion through swing phase.    Home Exercises and Patient Education Provided  Education provided:   - yes    Home Exercises Provided: yes.  Exercises were reviewed and Freddy was able to demonstrate them prior to the end of the session.  Freddy demonstrated good  understanding of the education provided.     Assessment   Patient with audible scar tissue release at distal medial incision with manual techniques today, making improvements of knee flexion while maintaining full extension of the left knee. Continues with distal quad tightness, tenderness and fascial tightness primarily along junction of rectus femoris and vastus lateralis. Tension loosened some with manual techniques though some tightness did continue. Improving gait mechanics by end of session with occasional cueing given for proper hip/knee flexion. Slowly progressing weight bearing activity to tolerance per protocol guidelines.     Pt presents with decreased ROM, muscle strength, flexibility, joint mobility. Increased pain, stiffness, soft tissue restriction. Impaired posture, joint mechanics, balance, gait pattern.     The patient's current task deficits include the following: limitation in ADL, self care, social/recreational tasks.     Patient prognosis: good  Rehab potential: good    Patient will benefit from skilled outpatient Physical Therapy to address the deficits stated above and in the chart below, provide patient /family education, to maximize patient's level of independence, and to " address functional deficits.    Anticipated Barriers for therapy: chronicity of current injury and post operative nature of current condition     Goals:   Short Terms Goals: 6 weeks    Goal  Progress  Date    (1)  Patient will be I with HOME EXERCISE PROGRAM  Progressing, Not Met 2/4/2025     (2)  Patient will obtain +3 deg L knee ext ACTIVE RANGE OF MOTION to indicate improved ability to stand, > 60 minutes  Progressing, Not Met  2/4/2025     (3)   Patient will obtain > 120 deg L knee flex ACTIVE RANGE OF MOTION to indicate improved sitting tolerance, > 60 minutes    Progressing, Not Met  2/4/2025       Long Term Goals: 12 weeks    Goal  Progress  Date    (1)  Patient will obtain 5/5 L knee ext MMT to indicate improved ability to walk, > 60 minutes  Progressing, Not Met  2/4/2025   (2)   Patient will obtain 5/5 L knee flex MMT to indicate improved ability to negotiate flight of stairs, alternating gait pattern, no HRA   Progressing, Not Met 2/4/2025    (3)   Patient 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    Outpatient Physical Therapy 3 times weekly for 2 weeks, then 2 times weekly for 10 weeks to include the following interventions: Cervical/Lumbar Traction, Electrical Stimulation re-eval, dry needling, Gait Training, Iontophoresis (with ), Manual Therapy, Moist Heat/ Ice, Neuromuscular Re-ed, Patient Education, Self Care, Therapeutic Activities, and Therapeutic Exercise.     Physical therapist and physical therapy assistant(s) will met face to face to discuss patient's treatment plan and progress towards established goals. Pt will be seen by a physical therapist minimally every 6th visit or every 30 days.    Linda Mercer, PTA

## 2025-02-05 NOTE — PROGRESS NOTES
[facility]   Physical Therapy Treatment Note     Name: Freddy Villegas  [facility] Number: [id number]    Therapy Diagnosis:   Encounter Diagnoses   Name Primary?    Sprain of anterior cruciate ligament of left knee, subsequent encounter Yes    Difficulty walking        Physician: Gaetano Polk MD    Physician Orders: PT Eval and Treat   Medical Diagnosis from Referral: S83.512A (ICD-10-CM) - Rupture of anterior cruciate ligament of left knee, initial encounter Z98.890 (ICD-10-CM) - S/P ACL reconstruction  Evaluation Date: 2/6/2025  Authorization Period Expiration: 3/21/2025  Plan of Care Expiration: 3/3/2025  Visit # / Visits authorized: 11/12  #Visits based on PHYSICAL THERAPY POC: 26     Foto  Date  Score      Knee  Lower Score = Greater Disability   #1/3 1/3/2025 31.3   #2/3 1/17/2025 52.5   #3/3       Time in  2:05 pm   Time out 3:10 pm    Total Appointment Time 65 minutes          Precautions: Standard        DOS: 12/31/2024  Subjective     HOME EXERCISE PROGRAM  Reviewed, reports compliance    Response  Still getting occasional tightness along the anterior knee, feels close to breaking through the tightness with flexion.   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, coaches football, teaches at school     Pain:      Current 5/10     Location L knee     Objective    2/6/2025:  Observation/posture: presents wearing brace locked at L knee/leg with B axillary crutches (crutches at a height too low for patient)    Gait: Partial weight bearing     Range of Motion (Degrees):   Knee Right  Left  Left  Left    Flexion 144 66 AAROM during heel slides  69 AAROM during heel slides  90 to start,  98 to end   Extension 0 -3 -2        Treatment   + indicates new exercise   Bold  "indicates completed exercise    therapeutic exercises to develop strength, endurance, ROM, flexibility, posture, and core stabilization for 25 minutes:  Prone knee flex stretch, with strap, 10"x10   Initiate next visit- L hip flex stretch, LE off edge of mat, 30"x5   Seated L knee flex stretch off edge of mat, overpressure with 10" holds from contralateral heel x 3'   2' with use of #4 at L ankle   L heel slide with strap and sock, 10"x10  L hamstring stretch with heel on step, 30"x3   Knee flexion stretch with foot on 2nd step 10 x 10"  L gastroc stretch, +incline board, 30"x3   +Prone femoral glide with clinician assist x 10 - some reduction of distal quad discomfort noted  L soleus stretch with strap, bolster under knee, 30"x5  +Pilates machine for knee flex, x 20   B heel raises,  B HRA, 3x10  L SLR flexion with brace locked in extension, with PHYSICAL THERAPY assist, 3x10  L SLR abduction with brace locked in extension, 3x10   L SLR extension with brace locked in extension, 3x10   L SLR adduction with brace locked in extension, 3x10    manual therapy techniques: Joint mobilizations, Manual traction, Myofacial release, Soft tissue Mobilization, and Friction Massage for 15 minutes:  L PFJ, knee joint (extension, flexion), Gr II/III/IV JM   L distal quads IASTM with large S tool for fascial release  STM fascial release as needed to distal quad  Scar mobilization    neuromuscular re-education activities to improve: Balance, Coordination, Kinesthetic, Sense, Proprioception, and Posture for 00 minutes:  Australian current - L quad sets, 10" on/10" off x 10'    therapeutic activities to improve functional performance for 17 minutes:  Upright bike, half revolutions, 10" hold each way for flexion, x5'   +Supine knee flexion with legs on orange ball, HS dig at end range 20 x 5" hold  Shuttle press B LE 50# 3 x 10 - self progression of knee flexion as tolerated      gait training to improve functional mobility and safety for " "08 minutes:  Weight shifting  Forward/retro, 3x10 over low orange gone  Medial/lateral, 2x10  Ambulation in clinic x 80 feet with cue for "stepping through high grass" for proper foot clearance and knee/hip flexion through swing phase.    Home Exercises and Patient Education Provided  Education provided:   - yes    Home Exercises Provided: yes.  Exercises were reviewed and Freddy was able to demonstrate them prior to the end of the session.  Freddy demonstrated good  understanding of the education provided.     Assessment   Patient improving with knee flexion though anterior knee and quad tightness continues. Making continued slow gains with knee flexion. Some reduction of distal quad tightness after prone femoral glides, some assistance to achieve to avoid over-straining. Improving gait mechanics by end of session with occasional cueing given for proper hip/knee flexion through swing phase. Slowly progressing weight bearing activity to tolerance per protocol guidelines.     Pt presents with decreased ROM, muscle strength, flexibility, joint mobility. Increased pain, stiffness, soft tissue restriction. Impaired posture, joint mechanics, balance, gait pattern.     The patient's current task deficits include the following: limitation in ADL, self care, social/recreational tasks.     Patient prognosis: good  Rehab potential: good    Patient will benefit from skilled outpatient Physical Therapy to address the deficits stated above and in the chart below, provide patient /family education, to maximize patient's level of independence, and to address functional deficits.    Anticipated Barriers for therapy: chronicity of current injury and post operative nature of current condition     Goals:   Short Terms Goals: 6 weeks    Goal  Progress  Date    (1)  Patient will be I with HOME EXERCISE PROGRAM  Progressing, Not Met 2/6/2025     (2)  Patient will obtain +3 deg L knee ext ACTIVE RANGE OF MOTION to indicate improved " ability to stand, > 60 minutes  Progressing, Not Met  2/6/2025     (3)   Patient will obtain > 120 deg L knee flex ACTIVE RANGE OF MOTION to indicate improved sitting tolerance, > 60 minutes    Progressing, Not Met  2/6/2025       Long Term Goals: 12 weeks    Goal  Progress  Date    (1)  Patient will obtain 5/5 L knee ext MMT to indicate improved ability to walk, > 60 minutes  Progressing, Not Met  2/6/2025   (2)   Patient will obtain 5/5 L knee flex MMT to indicate improved ability to negotiate flight of stairs, alternating gait pattern, no HRA   Progressing, Not Met 2/6/2025    (3)   Patient will return to previous level of function for participation in coaching tasks  Progressing, Not Met  2/6/2025       Plan     Plan of care Certification: 2/6/2025 to 3/3/2025.    Outpatient Physical Therapy 3 times weekly for 2 weeks, then 2 times weekly for 10 weeks to include the following interventions: Cervical/Lumbar Traction, Electrical Stimulation re-eval, dry needling, Gait Training, Iontophoresis (with ), Manual Therapy, Moist Heat/ Ice, Neuromuscular Re-ed, Patient Education, Self Care, Therapeutic Activities, and Therapeutic Exercise.     Physical therapist and physical therapy assistant(s) will met face to face to discuss patient's treatment plan and progress towards established goals. Pt will be seen by a physical therapist minimally every 6th visit or every 30 days.    Linda Mercer, PTA

## 2025-02-06 ENCOUNTER — CLINICAL SUPPORT (OUTPATIENT)
Dept: REHABILITATION | Facility: HOSPITAL | Age: 35
End: 2025-02-06
Payer: COMMERCIAL

## 2025-02-06 DIAGNOSIS — R26.2 DIFFICULTY WALKING: ICD-10-CM

## 2025-02-06 DIAGNOSIS — S83.512D SPRAIN OF ANTERIOR CRUCIATE LIGAMENT OF LEFT KNEE, SUBSEQUENT ENCOUNTER: Primary | ICD-10-CM

## 2025-02-06 PROCEDURE — 97530 THERAPEUTIC ACTIVITIES: CPT | Mod: PN,CQ

## 2025-02-06 PROCEDURE — 97140 MANUAL THERAPY 1/> REGIONS: CPT | Mod: PN,CQ

## 2025-02-06 PROCEDURE — 97116 GAIT TRAINING THERAPY: CPT | Mod: PN,CQ

## 2025-02-06 PROCEDURE — 97110 THERAPEUTIC EXERCISES: CPT | Mod: PN,CQ

## 2025-02-11 ENCOUNTER — CLINICAL SUPPORT (OUTPATIENT)
Dept: REHABILITATION | Facility: HOSPITAL | Age: 35
End: 2025-02-11
Payer: COMMERCIAL

## 2025-02-11 DIAGNOSIS — S83.512D SPRAIN OF ANTERIOR CRUCIATE LIGAMENT OF LEFT KNEE, SUBSEQUENT ENCOUNTER: Primary | ICD-10-CM

## 2025-02-11 DIAGNOSIS — R26.2 DIFFICULTY WALKING: ICD-10-CM

## 2025-02-11 PROCEDURE — 97140 MANUAL THERAPY 1/> REGIONS: CPT | Mod: PN | Performed by: PHYSICAL THERAPIST

## 2025-02-11 PROCEDURE — 97530 THERAPEUTIC ACTIVITIES: CPT | Mod: PN | Performed by: PHYSICAL THERAPIST

## 2025-02-11 PROCEDURE — 97110 THERAPEUTIC EXERCISES: CPT | Mod: PN | Performed by: PHYSICAL THERAPIST

## 2025-02-11 NOTE — PROGRESS NOTES
[facility]   Physical Therapy Progress Note     Name: Freddy Villegas  [facility] Number: [id number]    Therapy Diagnosis:   Encounter Diagnoses   Name Primary?    Sprain of anterior cruciate ligament of left knee, subsequent encounter Yes    Difficulty walking        Physician: Gaetano Polk MD    Physician Orders: PT Eval and Treat   Medical Diagnosis from Referral: S83.512A (ICD-10-CM) - Rupture of anterior cruciate ligament of left knee, initial encounter Z98.890 (ICD-10-CM) - S/P ACL reconstruction  Evaluation Date: 2/11/2025  Authorization Period Expiration: 12/31/2025  Plan of Care Expiration: 4/12/2025  Visit # / Visits authorized: 12/12  #Visits based on PHYSICAL THERAPY POC: 26     Foto  Date  Score      Knee  Lower Score = Greater Disability   #1/3 1/3/2025 31.3   #2/3 1/17/2025 52.5   #3/3       Time in  2:08pm   Time out 3:26pm   Total Appointment Time 78 minutes          Precautions: Standard    DOS: 12/31/2024    Subjective     HOME EXERCISE PROGRAM  Reviewed, reports compliance    Response  Front of L knee is feeling better. 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, coaches football, teaches at school     Pain:      Current 5/10     Location L knee     Objective    2/11/2025:  Gait: WBAT, lacks L hip flexion during gait    Range of Motion (Degrees):   Knee Right  Left  Left  Left  Left    Flexion 144 66 AAROM during heel slides  69 AAROM during heel slides  90 to start,  98 to end 99 AAROM during heel slides, 97 ACTIVE RANGE OF MOTION    Extension 0 -3 -2  -1       Lower Extremity Strength       Knee           Right          Left     Extension:  "5/5 tba   Flexion: 4+/5 tba      Function/balance:      Right  Left    Single leg stance  - 30"  tba   Squat tba  - -      Joint Mobility:     Right  Left    PFJ  WNL Hypomobile, painful   Knee joint   WNL Hypomobile, painful      Edema:     Joint line in cm 10 cm distal to tibial tuberosity in cm 5 cm above in cm   Right 39.0 36.5 40.0   Left  41.0 37.0 41.5   Left  41.0 37.0 40.0      Treatment   + indicates new exercise   Bold indicates completed exercise    therapeutic exercises to develop strength, endurance, ROM, flexibility, posture, and core stabilization for 50 minutes:  Prone knee flex stretch, with strap, 10"x10   +L hip flex stretch, LE off edge of mat, 30"x5   Seated L knee flex stretch off edge of mat, overpressure with 10" holds from contralateral heel x 3'   2' with use of #4 at L ankle   L heel slide with strap and sock, 10"x10  L hamstring stretch with heel on step, 30"x3   Knee flexion stretch with foot on 2nd step 10 x 10"  L gastroc stretch, incline board, 30"x5  +Verbal review of HOME EXERCISE PROGRAM (hip flexor stretch, TKE, heel raises)  +updated PHYSICAL THERAPY POC   +Prone femoral glide with clinician assist x 10 - some reduction of distal quad discomfort noted  L soleus stretch with strap, bolster under knee, 30"x5  +Pilates machine for knee flex, x 20   B heel raises,  B HRA, 3x10  L SLR flexion with brace locked in extension, with PHYSICAL THERAPY assist, 3x10  L SLR abduction with brace locked in extension, 3x10   L SLR extension with brace locked in extension, 3x10   L SLR adduction with brace locked in extension, 3x10    manual therapy techniques: Joint mobilizations, Manual traction, Myofacial release, Soft tissue Mobilization, and Friction Massage for 15 minutes:  L PFJ, knee joint (extension, flexion), Gr II/III/IV JM   L distal quads IASTM with large S tool for fascial release  STM fascial release as needed to distal quad  Scar mobilization    neuromuscular re-education " "activities to improve: Balance, Coordination, Kinesthetic, Sense, Proprioception, and Posture for 00 minutes:  [place]ian current - L quad sets, 10" on/10" off x 10'    therapeutic activities to improve functional performance for 8 minutes:  Upright bike, half revolutions, 10" hold each way for flexion, x8'   Supine knee flexion with legs on orange ball, HS dig at end range 20 x 5" hold  Shuttle press B LE 50# 3 x 10 - self progression of knee flexion as tolerated    gait training to improve functional mobility and safety for 00 minutes:  Weight shifting  Forward/retro, 3x10 over low orange gone  Medial/lateral, 2x10  Ambulation in clinic x 80 feet with cue for "stepping through high grass" for proper foot clearance and knee/hip flexion through swing phase.    Home Exercises and Patient Education Provided  Education provided:   - yes    Home Exercises Provided: yes.  Exercises were reviewed and Freddy was able to demonstrate them prior to the end of the session.  Freddy demonstrated good  understanding of the education provided.     Assessment   Continues with lack of L hip flexion during level surface ambulation. Edema has stabilized per updated circumferential measures taken today. He obtains 99 deg L knee flex and -1 deg L knee ext. Knee flex is painful and stiff beyond mid range. Making continued slow gains with knee flexion. Patient improving with knee flexion though anterior knee and quad tightness continues.    Pt presents with decreased ROM, muscle strength, flexibility, joint mobility. Increased pain, stiffness, soft tissue restriction. Impaired posture, joint mechanics, balance, gait pattern.     The patient's current task deficits include the following: limitation in ADL, self care, social/recreational tasks.     Patient prognosis: good  Rehab potential: good    Patient will benefit from skilled outpatient Physical Therapy to address the deficits stated above and in the chart below, provide patient /family " education, to maximize patient's level of independence, and to address functional deficits.    Anticipated Barriers for therapy: chronicity of current injury and post operative nature of current condition     Goals:   Short Terms Goals: 6 weeks    Goal  Progress  Date    (1)  Patient will be I with HOME EXERCISE PROGRAM  Progressing, Not Met 2/11/2025     (2)  Patient will obtain +3 deg L knee ext ACTIVE RANGE OF MOTION to indicate improved ability to stand, > 60 minutes  Progressing, Partially Met  2/11/2025     (3)   Patient will obtain > 120 deg L knee flex ACTIVE RANGE OF MOTION to indicate improved sitting tolerance, > 60 minutes   Ongoing, Not Met  2/11/2025       Long Term Goals: 12 weeks    Goal  Progress  Date    (1)  Patient will obtain 5/5 L knee ext MMT to indicate improved ability to walk, > 60 minutes  Progressing, Not Met  2/11/2025   (2)   Patient will obtain 5/5 L knee flex MMT to indicate improved ability to negotiate flight of stairs, alternating gait pattern, no HRA   Progressing, Not Met 2/11/2025    (3)   Patient will return to previous level of function for participation in coaching tasks  Progressing, Not Met  2/11/2025       Plan     Plan of care Certification: 2/11/2025 to 4/11/2025.    Outpatient Physical Therapy including the following interventions: Cervical/Lumbar Traction, Electrical Stimulation re-eval, dry needling, Gait Training, Iontophoresis (with ), Manual Therapy, Moist Heat/ Ice, Neuromuscular Re-ed, Patient Education, Self Care, Therapeutic Activities, and Therapeutic Exercise.     Physical therapist and physical therapy assistant(s) will met face to face to discuss patient's treatment plan and progress towards established goals. Pt will be seen by a physical therapist minimally every 6th visit or every 30 days.    Stacie Diaz, PT, DPT    I CERTIFY THE NEED FOR THESE SERVICES FURNISHED UNDER THIS PLAN OF TREATMENT AND WHILE UNDER MY CARE     Physician's comments:            Physician's Signature: ___________________________________________________

## 2025-02-13 ENCOUNTER — CLINICAL SUPPORT (OUTPATIENT)
Dept: REHABILITATION | Facility: HOSPITAL | Age: 35
End: 2025-02-13
Payer: COMMERCIAL

## 2025-02-13 DIAGNOSIS — R26.2 DIFFICULTY WALKING: ICD-10-CM

## 2025-02-13 DIAGNOSIS — S83.512D SPRAIN OF ANTERIOR CRUCIATE LIGAMENT OF LEFT KNEE, SUBSEQUENT ENCOUNTER: Primary | ICD-10-CM

## 2025-02-13 PROCEDURE — 97530 THERAPEUTIC ACTIVITIES: CPT | Mod: PN,CQ

## 2025-02-13 PROCEDURE — 97110 THERAPEUTIC EXERCISES: CPT | Mod: PN,CQ

## 2025-02-13 PROCEDURE — 97112 NEUROMUSCULAR REEDUCATION: CPT | Mod: PN,CQ

## 2025-02-13 PROCEDURE — 97140 MANUAL THERAPY 1/> REGIONS: CPT | Mod: PN,CQ

## 2025-02-13 NOTE — PROGRESS NOTES
[facility]   Physical Therapy Progress Note     Name: Freddy Villegas  [facility] Number: [id number]    Therapy Diagnosis:   Encounter Diagnoses   Name Primary?    Sprain of anterior cruciate ligament of left knee, subsequent encounter Yes    Difficulty walking          Physician: Gaetano Polk MD    Physician Orders: PT Eval and Treat   Medical Diagnosis from Referral: S83.512A (ICD-10-CM) - Rupture of anterior cruciate ligament of left knee, initial encounter Z98.890 (ICD-10-CM) - S/P ACL reconstruction  Evaluation Date: 2/13/2025  Authorization Period Expiration: 12/31/2025  Plan of Care Expiration: 4/12/2025  Visit # / Visits authorized: 13/24  #Visits based on PHYSICAL THERAPY POC: 26     Foto  Date  Score      Knee  Lower Score = Greater Disability   #1/3 1/3/2025 31.3   #2/3 1/17/2025 52.5   #3/3       Time in  2:03pm   Time out 3:03pm   Total Appointment Time 60 minutes          Precautions: Standard    DOS: 12/31/2024    Subjective     HOME EXERCISE PROGRAM  Reviewed, reports compliance    Response  Some tightness to the back of the knee. 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, coaches football, teaches at school     Pain:      Current 5/10 at end ranges     Location L knee     Objective    2/11/2025:  Gait: WBAT, lacks L hip flexion during gait    Range of Motion (Degrees):   Knee Right  Left  Left  Left  Left    Flexion 144 66 AAROM during heel slides  69 AAROM during heel slides  90 to start,  98 to end 105 PROM  102 AAROM     Extension 0 -3 -2  0       Lower Extremity Strength       Knee           Right          Left     Extension: 5/5 tba   Flexion: 4+/5  "tba      Function/balance:      Right  Left    Single leg stance  - 30"  tba   Squat tba  - -      Joint Mobility:     Right  Left    PFJ  WNL Hypomobile, painful   Knee joint   WNL Hypomobile, painful      Edema:     Joint line in cm 10 cm distal to tibial tuberosity in cm 5 cm above in cm   Right 39.0 36.5 40.0   Left  41.0 37.0 41.5   Left  41.0 37.0 40.0      Treatment   + indicates new exercise   Bold indicates completed exercise    therapeutic exercises to develop strength, endurance, ROM, flexibility, posture, and core stabilization for 20 minutes:  Prone knee flex stretch, with strap, 10"x10   +L hip flex stretch, LE off edge of mat, 30"x5   Seated L knee flex stretch off edge of mat, overpressure with 10" holds from contralateral heel x 3'   2' with use of #4 at L ankle   L heel slide with strap and sock, 10"x10  L hamstring stretch with heel on step, 30"x3   Knee flexion stretch with foot on 2nd step 10 x 10"  Heel raises 2 up:L lower  with hands at wall, 2x10  L gastroc stretch, incline board, 30"x5  +Verbal review of HOME EXERCISE PROGRAM (hip flexor stretch, TKE, heel raises)  +updated PHYSICAL THERAPY POC   +Prone femoral glide with clinician assist x 10 - some reduction of distal quad discomfort noted  L soleus stretch with strap, bolster under knee, 30"x5  +Pilates machine for knee flex, x 20   L SLR flexion with brace locked in extension, with PHYSICAL THERAPY assist, 3x10  L SLR abduction with brace locked in extension, 3x10   L SLR extension with brace locked in extension, 3x10   L SLR adduction with brace locked in extension, 3x10    manual therapy techniques: Joint mobilizations, Manual traction, Myofacial release, Soft tissue Mobilization, and Friction Massage for 13 minutes:  L PFJ, knee joint (extension, flexion), Gr II/III/IV JM   Posterior femoral glide to achieve hyperextension  L distal quads IASTM with large S tool for fascial release  STM fascial release as needed to distal quad  Scar " "mobilization    neuromuscular re-education activities to improve: Balance, Coordination, Kinesthetic, Sense, Proprioception, and Posture for 12 minutes:  [place]anese current - L quad sets, 10" on/10" off x 10'  +Quad set with heel on 1/2 foam, strap for overpressure 10 x 10" to emphasize hyperextension and quad recruitment  +Quad set with towel roll behind knees, R quad first for cognitive assist  +Alternating march with hands on wall, RTB loops at feet x 20 for coordination of hip and knee flexion    therapeutic activities to improve functional performance for 15 minutes:  Upright bike, half revolutions, 10" hold each way for flexion, x10', able to make slow full revolutions last 2 min  +2" lateral step down 2x10 - cue for proper knee flexion (maybe try 4" next time)  Supine knee flexion with legs on orange ball, HS dig at end range 20 x 5" hold  Shuttle press B LE 50# 3 x 10 - self progression of knee flexion as tolerated    gait training to improve functional mobility and safety for 00 minutes:  Weight shifting  Forward/retro, 3x10 over low orange gone  Medial/lateral, 2x10  Ambulation in clinic x 80 feet with cue for "stepping through high grass" for proper foot clearance and knee/hip flexion through swing phase.    Home Exercises and Patient Education Provided  Education provided:   - yes    Home Exercises Provided: yes.  Exercises were reviewed and Freddy was able to demonstrate them prior to the end of the session.  Freddy demonstrated good  understanding of the education provided.     Assessment   Continues with lack of L hip/knee flexion through swing phase during level surface ambulation but did improve some with work on improving knee mobility and motor control with muscle reversal. Making gains of knee ROM, able to achieve 1 deg of hyperextension more comfortably, knee flexion greater but still gets medial knee pain/discomfort. Noted improved quad activation and recruitment by end of session.    Pt " presents with decreased ROM, muscle strength, flexibility, joint mobility. Increased pain, stiffness, soft tissue restriction. Impaired posture, joint mechanics, balance, gait pattern.     The patient's current task deficits include the following: limitation in ADL, self care, social/recreational tasks.     Patient prognosis: good  Rehab potential: good    Patient will benefit from skilled outpatient Physical Therapy to address the deficits stated above and in the chart below, provide patient /family education, to maximize patient's level of independence, and to address functional deficits.    Anticipated Barriers for therapy: chronicity of current injury and post operative nature of current condition     Goals:   Short Terms Goals: 6 weeks    Goal  Progress  Date    (1)  Patient will be I with HOME EXERCISE PROGRAM  Progressing, Not Met 2/13/2025     (2)  Patient will obtain +3 deg L knee ext ACTIVE RANGE OF MOTION to indicate improved ability to stand, > 60 minutes  Progressing, Partially Met  2/13/2025     (3)   Patient will obtain > 120 deg L knee flex ACTIVE RANGE OF MOTION to indicate improved sitting tolerance, > 60 minutes   Ongoing, Not Met  2/13/2025       Long Term Goals: 12 weeks    Goal  Progress  Date    (1)  Patient will obtain 5/5 L knee ext MMT to indicate improved ability to walk, > 60 minutes  Progressing, Not Met  2/13/2025   (2)   Patient will obtain 5/5 L knee flex MMT to indicate improved ability to negotiate flight of stairs, alternating gait pattern, no HRA   Progressing, Not Met 2/13/2025    (3)   Patient will return to previous level of function for participation in coaching tasks  Progressing, Not Met  2/13/2025       Plan     Plan of care Certification: 2/13/2025 to 4/11/2025.    Outpatient Physical Therapy including the following interventions: Cervical/Lumbar Traction, Electrical Stimulation re-eval, dry needling, Gait Training, Iontophoresis (with ), Manual Therapy, Moist Heat/ Ice,  Neuromuscular Re-ed, Patient Education, Self Care, Therapeutic Activities, and Therapeutic Exercise.     Physical therapist and physical therapy assistant(s) will met face to face to discuss patient's treatment plan and progress towards established goals. Pt will be seen by a physical therapist minimally every 6th visit or every 30 days.    Linda Mercer, PTA

## 2025-02-14 DIAGNOSIS — Z98.890 S/P ACL RECONSTRUCTION: Primary | ICD-10-CM

## 2025-02-17 ENCOUNTER — OFFICE VISIT (OUTPATIENT)
Dept: ORTHOPEDICS | Facility: CLINIC | Age: 35
End: 2025-02-17
Payer: COMMERCIAL

## 2025-02-17 ENCOUNTER — HOSPITAL ENCOUNTER (OUTPATIENT)
Dept: RADIOLOGY | Facility: HOSPITAL | Age: 35
Discharge: HOME | End: 2025-02-17
Attending: ORTHOPAEDIC SURGERY
Payer: COMMERCIAL

## 2025-02-17 DIAGNOSIS — Z98.890 S/P ACL RECONSTRUCTION: Primary | ICD-10-CM

## 2025-02-17 DIAGNOSIS — Z98.890 S/P ACL RECONSTRUCTION: ICD-10-CM

## 2025-02-17 PROCEDURE — 73560 X-RAY EXAM OF KNEE 1 OR 2: CPT | Mod: TC,PO,RT

## 2025-02-17 NOTE — PROGRESS NOTES
34 years old 6 weeks out from ACL reconstruction with allograft doing well     Exam shows no signs infection Lachman test feels good     X-rays show well placed tunnels     Plan: Continue with therapy, follow up in 6 weeks time as a final postop visit with x-rays of his left knee

## 2025-02-18 ENCOUNTER — CLINICAL SUPPORT (OUTPATIENT)
Dept: REHABILITATION | Facility: HOSPITAL | Age: 35
End: 2025-02-18
Attending: PHYSICAL THERAPIST
Payer: COMMERCIAL

## 2025-02-18 DIAGNOSIS — S83.512D SPRAIN OF ANTERIOR CRUCIATE LIGAMENT OF LEFT KNEE, SUBSEQUENT ENCOUNTER: Primary | ICD-10-CM

## 2025-02-18 DIAGNOSIS — R26.2 DIFFICULTY WALKING: ICD-10-CM

## 2025-02-18 PROCEDURE — 97110 THERAPEUTIC EXERCISES: CPT | Mod: PN | Performed by: PHYSICAL THERAPIST

## 2025-02-18 PROCEDURE — 97140 MANUAL THERAPY 1/> REGIONS: CPT | Mod: PN | Performed by: PHYSICAL THERAPIST

## 2025-02-18 PROCEDURE — 97530 THERAPEUTIC ACTIVITIES: CPT | Mod: PN | Performed by: PHYSICAL THERAPIST

## 2025-02-18 NOTE — PROGRESS NOTES
"[facility]   Physical Therapy Progress Note     Name: Freddy Villegas  [facility] Number: [id number]    Therapy Diagnosis:   Encounter Diagnoses   Name Primary?    Sprain of anterior cruciate ligament of left knee, subsequent encounter Yes    Difficulty walking          Physician: Gaetano Polk MD    Physician Orders: PT Eval and Treat   Medical Diagnosis from Referral: S83.512A (ICD-10-CM) - Rupture of anterior cruciate ligament of left knee, initial encounter Z98.890 (ICD-10-CM) - S/P ACL reconstruction  Evaluation Date: 2/18/2025  Authorization Period Expiration: 12/31/2025  Plan of Care Expiration: 4/18/2025  Visit # / Visits authorized: 14/24  #Visits based on PHYSICAL THERAPY POC: 26     Foto  Date  Score      Knee  Lower Score = Greater Disability   #1/3 1/3/2025 31.3   #2/3 1/17/2025 52.5   #3/3 2/18/2025 63.8, closed      Time in  2:08pm   Time out 3:11pm   Total Appointment Time 63 minutes        Precautions: Standard    DOS: 12/31/2024    Subjective     HOME EXERCISE PROGRAM  Reviewed, reports compliance    Response  Feels like he is walking a little better    Current functional status  has returned to work as teacher, 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, coaches football, teaches at school     Pain:      Current 5/10 at end ranges     Location L knee     Objective    2/18/2025:  Gait: WBAT, lacks L hip flexion during gait    Range of Motion (Degrees):   Knee Right  Left  Left    Flexion 144 105 PROM  102 AAROM   102   Extension 0 0  -1      Lower Extremity Strength       Knee           Right          Left     Extension: 5/5 3/5   Flexion: 4+/5 4-/5      Function/balance:      Right  Left    Single leg stance  - 30"  tba   Squat tba  - -      Joint Mobility:     Right  Left    PFJ  WNL " "Hypomobile, painful   Knee joint   WNL Hypomobile, painful      Edema:     Joint line in cm 10 cm distal to tibial tuberosity in cm 5 cm above in cm   Right 39.0 36.5 40.0   Left  41.0 37.0 40.0   Left  41.5 37.0 40.0      Treatment   + indicates new exercise   Bold indicates completed exercise    therapeutic exercises to develop strength, endurance, ROM, flexibility, posture, and core stabilization for 10 minutes:  Prone knee flex stretch, with strap, 10"x10   L hip flex stretch, LE off edge of mat, 30"x5   Heel raises 2 up:L lower  with hands at wall, 2x10  +Updated PHYSICAL THERAPY POC    manual therapy techniques: Joint mobilizations, Manual traction, Myofacial release, Soft tissue Mobilization, and Friction Massage for 25 minutes:  L PFJ, knee joint (extension, flexion), Gr III/IV JM     neuromuscular re-education activities to improve: Balance, Coordination, Kinesthetic, Sense, Proprioception, and Posture for 00 minutes:  +Quad set with heel on 1/2 foam, strap for overpressure 10 x 10" to emphasize hyperextension and quad recruitment  +Quad set with towel roll behind knees, R quad first for cognitive assist  +Alternating march with hands on wall, RTB loops at feet x 20 for coordination of hip and knee flexion    therapeutic activities to improve functional performance for 28 minutes:  Upright bike, full revolutions, 8'  +4" forward step down with R LE leading and L LE on step, unilateral HRA, +3x10   Supine knee flexion with legs on orange ball, HS dig at end range 20 x 5" hold  Shuttle press B LE 50#, 5" hold each way, 3 x 10 - self progression of knee flexion as tolerated    gait training to improve functional mobility and safety for 00 minutes:  Weight shifting  Forward/retro, 3x10 over low orange gone  Medial/lateral, 2x10  Ambulation in clinic x 80 feet with cue for "stepping through high grass" for proper foot clearance and knee/hip flexion through swing phase.    Home Exercises and Patient Education " Provided  Education provided:   - yes    Home Exercises Provided: yes.  Exercises were reviewed and Freddy was able to demonstrate them prior to the end of the session.  Freddy demonstrated good  understanding of the education provided.     Assessment   Improvement in ability to complete step down from 4inch block with R LE leading with increased practice trials today. Gradually improving tolerance to knee flex manual intervention with continued emphasis required at proximal tibia. Gait pattern gradually improving and patient able to complete 8 minutes with full revolutions on upright bike today.     Pt presents with decreased ROM, muscle strength, flexibility, joint mobility. Increased pain, stiffness, soft tissue restriction. Impaired posture, joint mechanics, balance, gait pattern.     The patient's current task deficits include the following: limitation in ADL, self care, social/recreational tasks.     Patient prognosis: good  Rehab potential: good    Patient will benefit from skilled outpatient Physical Therapy to address the deficits stated above and in the chart below, provide patient /family education, to maximize patient's level of independence, and to address functional deficits.    Anticipated Barriers for therapy: chronicity of current injury and post operative nature of current condition     Goals:   Short Terms Goals: 6 weeks    Goal  Progress  Date    (1)  Patient will be I with HOME EXERCISE PROGRAM  Progressing, Not Met 2/18/2025     (2)  Patient will obtain +3 deg L knee ext ACTIVE RANGE OF MOTION to indicate improved ability to stand, > 60 minutes  Progressing, Partially Met  2/18/2025     (3)   Patient will obtain > 120 deg L knee flex ACTIVE RANGE OF MOTION to indicate improved sitting tolerance, > 60 minutes   Ongoing, Not Met  2/18/2025       Long Term Goals: 12 weeks    Goal  Progress  Date    (1)  Patient will obtain 5/5 L knee ext MMT to indicate improved ability to walk, > 60 minutes   Progressing, Not Met  2/18/2025   (2)   Patient will obtain 5/5 L knee flex MMT to indicate improved ability to negotiate flight of stairs, alternating gait pattern, no HRA   Progressing, Not Met 2/18/2025    (3)   Patient will return to previous level of function for participation in coaching tasks  Progressing, Not Met  2/18/2025       Plan     Plan of care Certification: 2/18/2025 to 4/18/2025.    Outpatient Physical Therapy including the following interventions: Cervical/Lumbar Traction, Electrical Stimulation re-eval, dry needling, Gait Training, Iontophoresis (with ), Manual Therapy, Moist Heat/ Ice, Neuromuscular Re-ed, Patient Education, Self Care, Therapeutic Activities, and Therapeutic Exercise.     Physical therapist and physical therapy assistant(s) will met face to face to discuss patient's treatment plan and progress towards established goals. Pt will be seen by a physical therapist minimally every 6th visit or every 30 days.    Stacie Diaz, PT, DPT      I CERTIFY THE NEED FOR THESE SERVICES FURNISHED UNDER THIS PLAN OF TREATMENT AND WHILE UNDER MY CARE     Physician's comments:           Physician's Signature: ___________________________________________________

## 2025-02-20 ENCOUNTER — CLINICAL SUPPORT (OUTPATIENT)
Dept: REHABILITATION | Facility: HOSPITAL | Age: 35
End: 2025-02-20
Payer: COMMERCIAL

## 2025-02-20 DIAGNOSIS — S83.512D SPRAIN OF ANTERIOR CRUCIATE LIGAMENT OF LEFT KNEE, SUBSEQUENT ENCOUNTER: Primary | ICD-10-CM

## 2025-02-20 DIAGNOSIS — R26.2 DIFFICULTY WALKING: ICD-10-CM

## 2025-02-20 PROCEDURE — 97530 THERAPEUTIC ACTIVITIES: CPT | Mod: PN | Performed by: PHYSICAL THERAPIST

## 2025-02-20 PROCEDURE — 97140 MANUAL THERAPY 1/> REGIONS: CPT | Mod: PN | Performed by: PHYSICAL THERAPIST

## 2025-02-20 NOTE — PROGRESS NOTES
"[facility]   Physical Therapy Treatment Note     Name: Freddy Villegas  [facility] Number: [id number]    Therapy Diagnosis:   Encounter Diagnoses   Name Primary?    Sprain of anterior cruciate ligament of left knee, subsequent encounter Yes    Difficulty walking      Physician: Gaetano Polk MD    Physician Orders: PT Eval and Treat   Medical Diagnosis from Referral: S83.512A (ICD-10-CM) - Rupture of anterior cruciate ligament of left knee, initial encounter Z98.890 (ICD-10-CM) - S/P ACL reconstruction  Evaluation Date: 2/20/2025  Authorization Period Expiration: 12/31/2025  Plan of Care Expiration: 4/18/2025  Visit # / Visits authorized: 16/24  #Visits based on PHYSICAL THERAPY POC: 26     Foto  Date  Score      Knee  Lower Score = Greater Disability   #1/3 1/3/2025 31.3   #2/3 1/17/2025 52.5   #3/3 2/18/2025 63.8, closed      Time in  2:05pm   Time out 3:10pm   Total Appointment Time 65 minutes        Precautions: Standard    DOS: 12/31/2024    Subjective     HOME EXERCISE PROGRAM  Reviewed, reports compliance    Response  Feels like he is walking a little better. 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, coaches football, teaches at school     Pain:      Current 5/10 at end ranges     Location L knee     Objective    2/20/2025:  Gait: WBAT, lacks L hip flexion during gait    Range of Motion (Degrees):   Knee Right  Left  Left    Flexion 144 105 PROM  102 AAROM   104    Extension 0 0  -1      Lower Extremity Strength       Knee           Right          Left     Extension: 5/5 3/5   Flexion: 4+/5 4-/5      Function/balance:      Right  Left    Single leg stance  - 30"  tba   Squat tba  " "- -      Joint Mobility:     Right  Left    PFJ  WNL Hypomobile, painful   Knee joint   WNL Hypomobile, painful      Edema:     Joint line in cm 10 cm distal to tibial tuberosity in cm 5 cm above in cm   Right 39.0 36.5 40.0   Left  41.5 37.0 40.0      Treatment   + indicates new exercise   Bold indicates completed exercise    therapeutic exercises to develop strength, endurance, ROM, flexibility, posture, and core stabilization for 00 minutes:  Prone knee flex stretch, with strap, 10"x10   L hip flex stretch, LE off edge of mat, 30"x5   Heel raises 2 up:L lower  with hands at wall, 2x10    manual therapy techniques: Joint mobilizations, Manual traction, Myofacial release, Soft tissue Mobilization, and Friction Massage for 15 minutes:  L PFJ, knee joint (extension, flexion), Gr III/IV JM     neuromuscular re-education activities to improve: Balance, Coordination, Kinesthetic, Sense, Proprioception, and Posture for 00 minutes:  +Quad set with heel on 1/2 foam, strap for overpressure 10 x 10" to emphasize hyperextension and quad recruitment  +Quad set with towel roll behind knees, R quad first for cognitive assist  +Alternating march with hands on wall, RTB loops at feet x 20 for coordination of hip and knee flexion    therapeutic activities to improve functional performance for 50 minutes:  Upright bike, full revolutions, 8'  +6" forward step down with R LE leading and L LE on step, unilateral HRA, +green band above knees, 3x10  +6" lateral step down with R LE leading ad L LE on step, B HRS, +green band above knees, 3x10   +6" forward step up with L LE leading and R hip moving into flexion, 3x10  +Shuttle press L LE #12.5, 5" hold each way, 3 x 10  +Lateral monster walks, green band above knees, x 5 laps    gait training to improve functional mobility and safety for 00 minutes:      Home Exercises and Patient Education Provided  Education provided:   - yes    Home Exercises Provided: yes.  Exercises were reviewed and " Freddy was able to demonstrate them prior to the end of the session.  Freddy demonstrated good  understanding of the education provided.     Assessment   Lateral monster walks initiated and stepping exercises progressed today. Patient exhibits habitual movement into L LE IR torsion during exercises and transitional movements. Green band implemented above knees to promote neutral rotation of L LE with good response.     Pt presents with decreased ROM, muscle strength, flexibility, joint mobility. Increased pain, stiffness, soft tissue restriction. Impaired posture, joint mechanics, balance, gait pattern.     The patient's current task deficits include the following: limitation in ADL, self care, social/recreational tasks.     Patient prognosis: good  Rehab potential: good    Patient will benefit from skilled outpatient Physical Therapy to address the deficits stated above and in the chart below, provide patient /family education, to maximize patient's level of independence, and to address functional deficits.    Anticipated Barriers for therapy: chronicity of current injury and post operative nature of current condition     Goals:   Short Terms Goals: 6 weeks    Goal  Progress  Date    (1)  Patient will be I with HOME EXERCISE PROGRAM  Progressing, Not Met 2/20/2025     (2)  Patient will obtain +3 deg L knee ext ACTIVE RANGE OF MOTION to indicate improved ability to stand, > 60 minutes  Progressing, Partially Met  2/20/2025     (3)   Patient will obtain > 120 deg L knee flex ACTIVE RANGE OF MOTION to indicate improved sitting tolerance, > 60 minutes   Ongoing, Not Met  2/20/2025       Long Term Goals: 12 weeks    Goal  Progress  Date    (1)  Patient will obtain 5/5 L knee ext MMT to indicate improved ability to walk, > 60 minutes  Progressing, Not Met  2/20/2025   (2)   Patient will obtain 5/5 L knee flex MMT to indicate improved ability to negotiate flight of stairs, alternating gait pattern, no HRA   Progressing,  Not Met 2/20/2025    (3)   Patient 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    Outpatient Physical Therapy including the following interventions: Cervical/Lumbar Traction, Electrical Stimulation re-eval, dry needling, Gait Training, Iontophoresis (with ), Manual Therapy, Moist Heat/ Ice, Neuromuscular Re-ed, Patient Education, Self Care, Therapeutic Activities, and Therapeutic Exercise.     Physical therapist and physical therapy assistant(s) will met face to face to discuss patient's treatment plan and progress towards established goals. Pt will be seen by a physical therapist minimally every 6th visit or every 30 days.    Stacie Diaz, PT, DPT

## 2025-02-25 ENCOUNTER — CLINICAL SUPPORT (OUTPATIENT)
Dept: REHABILITATION | Facility: HOSPITAL | Age: 35
End: 2025-02-25
Payer: COMMERCIAL

## 2025-02-25 DIAGNOSIS — R26.2 DIFFICULTY WALKING: ICD-10-CM

## 2025-02-25 DIAGNOSIS — S83.512D SPRAIN OF ANTERIOR CRUCIATE LIGAMENT OF LEFT KNEE, SUBSEQUENT ENCOUNTER: Primary | ICD-10-CM

## 2025-02-25 PROCEDURE — 97530 THERAPEUTIC ACTIVITIES: CPT | Mod: PN,CQ

## 2025-02-25 PROCEDURE — 97140 MANUAL THERAPY 1/> REGIONS: CPT | Mod: PN,CQ

## 2025-02-25 NOTE — PROGRESS NOTES
"[facility]   Physical Therapy Treatment Note     Name: Freddy Villegas  [facility] Number: [id number]    Therapy Diagnosis:   Encounter Diagnoses   Name Primary?    Sprain of anterior cruciate ligament of left knee, subsequent encounter Yes    Difficulty walking      Physician: Gaetano Polk MD    Physician Orders: PT Eval and Treat   Medical Diagnosis from Referral: S83.512A (ICD-10-CM) - Rupture of anterior cruciate ligament of left knee, initial encounter Z98.890 (ICD-10-CM) - S/P ACL reconstruction  Evaluation Date: 2/25/2025  Authorization Period Expiration: 12/31/2025  Plan of Care Expiration: 4/18/2025  Visit # / Visits authorized: 16/24  #Visits based on PHYSICAL THERAPY POC: 26     Foto  Date  Score      Knee  Lower Score = Greater Disability   #1/3 1/3/2025 31.3   #2/3 1/17/2025 52.5   #3/3 2/18/2025 63.8, closed      Time in  2:07 pm   Time out 3:02 pm   Total Appointment Time 55 minutes        Precautions: Standard    DOS: 12/31/2024    Subjective     HOME EXERCISE PROGRAM  Reviewed, reports compliance    Response  Feels like he is walking a little better. 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, coaches football, teaches at school     Pain:      Current 5/10 at end ranges     Location L knee     Objective    2/20/2025:  Gait: WBAT, lacks L hip flexion during gait    Range of Motion (Degrees):   Knee Right  Left  Left    Flexion 144 105 PROM  102 AAROM   113 A  116 P   Extension 0 0  -1      Lower Extremity Strength       Knee           Right          Left     Extension: 5/5 3/5   Flexion: 4+/5 4-/5      Function/balance:      Right  Left    Single leg stance  - 30"  tba " "  Squat tba  - -      Joint Mobility:     Right  Left    PFJ  WNL Hypomobile, painful   Knee joint   WNL Hypomobile, painful      Edema:     Joint line in cm 10 cm distal to tibial tuberosity in cm 5 cm above in cm   Right 39.0 36.5 40.0   Left  41.5 37.0 40.0      Treatment   + indicates new exercise   Bold indicates completed exercise    therapeutic exercises to develop strength, endurance, ROM, flexibility, posture, and core stabilization for 00 minutes:  Prone knee flex stretch, with strap, 10"x10   L hip flex stretch, LE off edge of mat, 30"x5   Heel raises 2 up:L lower  with hands at wall, 2x10    manual therapy techniques: Joint mobilizations, Manual traction, Myofacial release, Soft tissue Mobilization, and Friction Massage for 8 minutes:  L PFJ, knee joint (extension, flexion), Gr III/IV JM  Manual knee flexion with overpressure to improve knee mobility, STM along anterior knee as needed     neuromuscular re-education activities to improve: Balance, Coordination, Kinesthetic, Sense, Proprioception, and Posture for 00 minutes:  +Quad set with heel on 1/2 foam, strap for overpressure 10 x 10" to emphasize hyperextension and quad recruitment  +Quad set with towel roll behind knees, R quad first for cognitive assist  +Alternating march with hands on wall, RTB loops at feet x 20 for coordination of hip and knee flexion    therapeutic activities to improve functional performance for 47 minutes:  Upright bike, full revolutions, 8'  +6" forward step down with R LE leading and L LE on step, unilateral HRA, +green band above knees, 2x10  +6" lateral step down with R LE leading ad L LE on step, B HRS, +green band above knees, 2x10   +6" forward step up with L LE leading and R hip moving into flexion, 2x10  +Shuttle 2:1 (L lower) pushes 50# 2 x 10  Shuttle press L LE #12.5, 5" hold each way, 3 x 10  Lateral monster walks, green band above knees, x 4 laps    gait training to improve functional mobility and safety for 00 " minutes:      Home Exercises and Patient Education Provided  Education provided:   - yes    Home Exercises Provided: yes.  Exercises were reviewed and Fredyd was able to demonstrate them prior to the end of the session.  Freddy demonstrated good  understanding of the education provided.     Assessment   Patient with improving knee mobility and ROM, continued tightness into end range flexion. Hamstring activation during reciprocal knee flexion/extension remains impaired with open chain movements. Greater quad activation and muscle recruitment with stepping activities, muscle shaking still present. Better control of L knee IR torsion with step downs, less cueing required for control.     Pt presents with decreased ROM, muscle strength, flexibility, joint mobility. Increased pain, stiffness, soft tissue restriction. Impaired posture, joint mechanics, balance, gait pattern.     The patient's current task deficits include the following: limitation in ADL, self care, social/recreational tasks.     Patient prognosis: good  Rehab potential: good    Patient will benefit from skilled outpatient Physical Therapy to address the deficits stated above and in the chart below, provide patient /family education, to maximize patient's level of independence, and to address functional deficits.    Anticipated Barriers for therapy: chronicity of current injury and post operative nature of current condition     Goals:   Short Terms Goals: 6 weeks    Goal  Progress  Date    (1)  Patient will be I with HOME EXERCISE PROGRAM  Progressing, Not Met 2/25/2025     (2)  Patient will obtain +3 deg L knee ext ACTIVE RANGE OF MOTION to indicate improved ability to stand, > 60 minutes  Progressing, Partially Met  2/25/2025     (3)   Patient will obtain > 120 deg L knee flex ACTIVE RANGE OF MOTION to indicate improved sitting tolerance, > 60 minutes   Ongoing, Not Met  2/25/2025       Long Term Goals: 12 weeks    Goal  Progress  Date    (1)   Patient will obtain 5/5 L knee ext MMT to indicate improved ability to walk, > 60 minutes  Progressing, Not Met  2/25/2025   (2)   Patient will obtain 5/5 L knee flex MMT to indicate improved ability to negotiate flight of stairs, alternating gait pattern, no HRA   Progressing, Not Met 2/25/2025    (3)   Patient will return to previous level of function for participation in coaching tasks  Progressing, Not Met  2/25/2025       Plan     Plan of care Certification: 2/25/2025 to 4/18/2025.    Outpatient Physical Therapy including the following interventions: Cervical/Lumbar Traction, Electrical Stimulation re-eval, dry needling, Gait Training, Iontophoresis (with ), Manual Therapy, Moist Heat/ Ice, Neuromuscular Re-ed, Patient Education, Self Care, Therapeutic Activities, and Therapeutic Exercise.     Physical therapist and physical therapy assistant(s) will met face to face to discuss patient's treatment plan and progress towards established goals. Pt will be seen by a physical therapist minimally every 6th visit or every 30 days.    Linda Mercer, PTA

## 2025-02-27 ENCOUNTER — CLINICAL SUPPORT (OUTPATIENT)
Dept: REHABILITATION | Facility: HOSPITAL | Age: 35
End: 2025-02-27
Payer: COMMERCIAL

## 2025-02-27 DIAGNOSIS — R26.2 DIFFICULTY WALKING: ICD-10-CM

## 2025-02-27 DIAGNOSIS — S83.512D SPRAIN OF ANTERIOR CRUCIATE LIGAMENT OF LEFT KNEE, SUBSEQUENT ENCOUNTER: Primary | ICD-10-CM

## 2025-02-27 PROCEDURE — 97530 THERAPEUTIC ACTIVITIES: CPT | Mod: PN | Performed by: PHYSICAL THERAPIST

## 2025-02-27 PROCEDURE — 97140 MANUAL THERAPY 1/> REGIONS: CPT | Mod: PN | Performed by: PHYSICAL THERAPIST

## 2025-02-27 NOTE — PROGRESS NOTES
"[facility]   Physical Therapy Treatment Note     Name: Freddy Villegas  [facility] Number: [id number]    Therapy Diagnosis:   No diagnosis found.    Physician: Gaetano Polk MD    Physician Orders: PT Eval and Treat   Medical Diagnosis from Referral: S83.512A (ICD-10-CM) - Rupture of anterior cruciate ligament of left knee, initial encounter Z98.890 (ICD-10-CM) - S/P ACL reconstruction  Evaluation Date: 2/27/2025  Authorization Period Expiration: 12/31/2025  Plan of Care Expiration: 4/18/2025  Visit # / Visits authorized: 17/24  #Visits based on PHYSICAL THERAPY POC: 26     Foto  Date  Score      Knee  Lower Score = Greater Disability   #1/3 1/3/2025 31.3   #2/3 1/17/2025 52.5   #3/3 2/18/2025 63.8, closed      Time in  2:06pm   Time out 3:02pm   Total Appointment Time 56 minutes        Precautions: Standard    DOS: 12/31/2024    Subjective     HOME EXERCISE PROGRAM  Reviewed, reports compliance    Response  Feels like he is walking a little better. 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, coaches football, teaches at school     Pain:      Current 5/10 at end ranges     Location L knee     Objective    2/27/2025:  Gait: WBAT, lacks L hip flexion during gait    Range of Motion (Degrees):   Knee Right  Left  Left    Flexion 144 105 PROM  102 AAROM   115 A  116 P   Extension 0 0  0      Lower Extremity Strength       Knee           Right          Left     Extension: 5/5 3/5   Flexion: 4+/5 4-/5      Function/balance:      Right  Left    Single leg stance  - 30"  tba   Squat tba  - -      Joint Mobility:    " " Right  Left    PFJ  WNL Hypomobile, painful   Knee joint   WNL Hypomobile, painful      Edema:     Joint line in cm 10 cm distal to tibial tuberosity in cm 5 cm above in cm   Right 39.0 36.5 40.0   Left  41.5 37.0 40.0      Treatment   + indicates new exercise   Bold indicates completed exercise    therapeutic exercises to develop strength, endurance, ROM, flexibility, posture, and core stabilization for 00 minutes:  Prone knee flex stretch, with strap, 10"x10   L hip flex stretch, LE off edge of mat, 30"x5   Heel raises 2 up:L lower  with hands at wall, 2x10    manual therapy techniques: Joint mobilizations, Manual traction, Myofacial release, Soft tissue Mobilization, and Friction Massage for 8 minutes:  L PFJ, knee joint (extension, flexion + tibial ER), Gr III/IV JM  Manual knee flexion with overpressure to improve knee mobility, STM along anterior knee as needed     neuromuscular re-education activities to improve: Balance, Coordination, Kinesthetic, Sense, Proprioception, and Posture for 00 minutes:  +Quad set with heel on 1/2 foam, strap for overpressure 10 x 10" to emphasize hyperextension and quad recruitment  +Quad set with towel roll behind knees, R quad first for cognitive assist  +Alternating march with hands on wall, RTB loops at feet x 20 for coordination of hip and knee flexion    therapeutic activities to improve functional performance for 48 minutes:  Upright bike, full revolutions, 8'  6" forward step down with R LE leading and L LE on step, unilateral HRA, +green band above knees, 2x10  6" lateral step down with R LE leading ad L LE on step, B HRS, +green band above knees, 2x10   6" forward step up with L LE leading and R hip moving into flexion, 2x10  Shuttle 2:1 (L lower) pushes +72.2#, 3x 10  Shuttle press L LE #12.5, 5" hold each way, 3 x 10  Lateral monster walks, green band above knees, x 4 laps    gait training to improve functional mobility and safety for 00 minutes:      Home Exercises " and Patient Education Provided  Education provided:   - yes    Home Exercises Provided: yes.  Exercises were reviewed and Freddy was able to demonstrate them prior to the end of the session.  Freddy demonstrated good  understanding of the education provided.     Assessment   Patient with improving knee mobility and ROM, continued tightness into end range flexion. Hamstring activation during reciprocal knee flexion/extension remains impaired with open chain movements. Greater quad activation and muscle recruitment with stepping activities, muscle shaking still present. Better control of L knee IR torsion with step downs, less cueing required for control.     115 deg L knee flex and 0 deg L knee ext obtained actively today towards end of tx session. Patient complains of pain behind his knee with flex/ext movements. Hamstring stretch completed in attempt to address L posterior knee pain.     Pt presents with decreased ROM, muscle strength, flexibility, joint mobility. Increased pain, stiffness, soft tissue restriction. Impaired posture, joint mechanics, balance, gait pattern.     The patient's current task deficits include the following: limitation in ADL, self care, social/recreational tasks.     Patient prognosis: good  Rehab potential: good    Patient will benefit from skilled outpatient Physical Therapy to address the deficits stated above and in the chart below, provide patient /family education, to maximize patient's level of independence, and to address functional deficits.    Anticipated Barriers for therapy: chronicity of current injury and post operative nature of current condition     Goals:   Short Terms Goals: 6 weeks    Goal  Progress  Date    (1)  Patient will be I with HOME EXERCISE PROGRAM  Progressing, Not Met 2/27/2025     (2)  Patient will obtain +3 deg L knee ext ACTIVE RANGE OF MOTION to indicate improved ability to stand, > 60 minutes  Progressing, Partially Met  2/27/2025     (3)   Patient  will obtain > 120 deg L knee flex ACTIVE RANGE OF MOTION to indicate improved sitting tolerance, > 60 minutes   Ongoing, Not Met  2/27/2025       Long Term Goals: 12 weeks    Goal  Progress  Date    (1)  Patient will obtain 5/5 L knee ext MMT to indicate improved ability to walk, > 60 minutes  Progressing, Not Met  2/27/2025   (2)   Patient will obtain 5/5 L knee flex MMT to indicate improved ability to negotiate flight of stairs, alternating gait pattern, no HRA   Progressing, Not Met 2/27/2025    (3)   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    Outpatient Physical Therapy including the following interventions: Cervical/Lumbar Traction, Electrical Stimulation re-eval, dry needling, Gait Training, Iontophoresis (with ), Manual Therapy, Moist Heat/ Ice, Neuromuscular Re-ed, Patient Education, Self Care, Therapeutic Activities, and Therapeutic Exercise.     Physical therapist and physical therapy assistant(s) will met face to face to discuss patient's treatment plan and progress towards established goals. Pt will be seen by a physical therapist minimally every 6th visit or every 30 days.    Stacie Diaz, PT, DPT

## 2025-03-07 ENCOUNTER — CLINICAL SUPPORT (OUTPATIENT)
Dept: REHABILITATION | Facility: HOSPITAL | Age: 35
End: 2025-03-07
Payer: COMMERCIAL

## 2025-03-07 DIAGNOSIS — S83.512D SPRAIN OF ANTERIOR CRUCIATE LIGAMENT OF LEFT KNEE, SUBSEQUENT ENCOUNTER: Primary | ICD-10-CM

## 2025-03-07 DIAGNOSIS — R26.2 DIFFICULTY WALKING: ICD-10-CM

## 2025-03-07 PROCEDURE — 97530 THERAPEUTIC ACTIVITIES: CPT | Mod: PN | Performed by: PHYSICAL THERAPIST

## 2025-03-07 PROCEDURE — 97140 MANUAL THERAPY 1/> REGIONS: CPT | Mod: PN | Performed by: PHYSICAL THERAPIST

## 2025-03-07 NOTE — PROGRESS NOTES
[facility]   Physical Therapy Treatment Note     Name: Freddy Villegas  [facility] Number: [id number]    Therapy Diagnosis:   Encounter Diagnoses   Name Primary?    Sprain of anterior cruciate ligament of left knee, subsequent encounter Yes    Difficulty walking        Physician: Gaetano Polk MD    Physician Orders: PT Eval and Treat   Medical Diagnosis from Referral: S83.512A (ICD-10-CM) - Rupture of anterior cruciate ligament of left knee, initial encounter Z98.890 (ICD-10-CM) - S/P ACL reconstruction  Evaluation Date: 3/7/2025  Authorization Period Expiration: 12/31/2025  Plan of Care Expiration: 4/18/2025  Visit # / Visits authorized: 18/24  #Visits based on PHYSICAL THERAPY POC: 26     Foto  Date  Score      Knee  Lower Score = Greater Disability   #1/3 1/3/2025 31.3   #2/3 1/17/2025 52.5   #3/3 2/18/2025 63.8, closed      Time in  8:57am   Time out 10:04am   Total Appointment Time 67 minutes        Precautions: Standard    DOS: 12/31/2024    Subjective     HOME EXERCISE PROGRAM  Reviewed, reports compliance    Response  Feels like he is walking a little better. 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, coaches football, teaches at school     Pain:      Current 5/10 at end ranges     Location L knee     Objective    2/27/2025:  Gait: WBAT, lacks L hip flexion during gait    Range of Motion (Degrees):   Knee Right  Left  Left    Flexion 144 105 PROM  102 AAROM   115 A  116 P   Extension 0 0  0      Lower Extremity Strength       Knee           Right          Left     Extension: 5/5 3/5   Flexion:  "4+/5 4-/5      Function/balance:      Right  Left    Single leg stance  - 30"  tba   Squat tba  - -      Joint Mobility:     Right  Left    PFJ  WNL Hypomobile, painful   Knee joint   WNL Hypomobile, painful      Edema:     Joint line in cm 10 cm distal to tibial tuberosity in cm 5 cm above in cm   Right 39.0 36.5 40.0   Left  41.5 37.0 40.0      Treatment   + indicates new exercise   Bold indicates completed exercise    therapeutic exercises to develop strength, endurance, ROM, flexibility, posture, and core stabilization for 00 minutes:  Prone knee flex stretch, with strap, 10"x10   L hip flex stretch, LE off edge of mat, 30"x5   Heel raises 2 up:L lower  with hands at wall, 2x10    manual therapy techniques: Joint mobilizations, Manual traction, Myofacial release, Soft tissue Mobilization, and Friction Massage for 10 minutes:  L PFJ, knee joint (extension, flexion + tibial ER), Gr III/IV JM  Manual knee flexion with overpressure to improve knee mobility, STM along anterior knee as needed     neuromuscular re-education activities to improve: Balance, Coordination, Kinesthetic, Sense, Proprioception, and Posture for 00 minutes:      therapeutic activities to improve functional performance for 57 minutes:  Upright bike, full revolutions, lvl 2 resistance, 8'  6" forward step down with R/L LE, unilateral HRA, 3x10  6" lateral step down, R/L LE, unilateral HRA, 3x10  +12" forward step up with R/ LE, 3x10  Shuttle 2:1 (L lower) pushes +75, 3x 10  Shuttle press L LE #12.5, 5" hold each way, 3 x 10  Lateral monster walks, +black band above knees, x 3 laps  +Forward/retro monster walks, +black band above knees, x 3 laps     gait training to improve functional mobility and safety for 00 minutes:      Home Exercises and Patient Education Provided  Education provided:   - yes    Home Exercises Provided: yes.  Exercises were reviewed and Freddy was able to demonstrate them prior to the end of the session.  Freddy " demonstrated good  understanding of the education provided.     Assessment   Patient completes monster walks with use of black band above knees with exercise meeting patient's optimal challenge point. Patient complains of pain behind his knee with flex/ext movements. Hamstring stretch reviewed in attempt to address L posterior knee pain.     Pt presents with decreased ROM, muscle strength, flexibility, joint mobility. Increased pain, stiffness, soft tissue restriction. Impaired posture, joint mechanics, balance, gait pattern.     The patient's current task deficits include the following: limitation in ADL, self care, social/recreational tasks.     Patient prognosis: good  Rehab potential: good    Patient will benefit from skilled outpatient Physical Therapy to address the deficits stated above and in the chart below, provide patient /family education, to maximize patient's level of independence, and to address functional deficits.    Anticipated Barriers for therapy: chronicity of current injury and post operative nature of current condition     Goals:   Short Terms Goals: 6 weeks    Goal  Progress  Date    (1)  Patient will be I with HOME EXERCISE PROGRAM  Progressing, Not Met 3/7/2025     (2)  Patient will obtain +3 deg L knee ext ACTIVE RANGE OF MOTION to indicate improved ability to stand, > 60 minutes  Progressing, Partially Met  3/7/2025     (3)   Patient will obtain > 120 deg L knee flex ACTIVE RANGE OF MOTION to indicate improved sitting tolerance, > 60 minutes   Ongoing, Not Met  3/7/2025       Long Term Goals: 12 weeks    Goal  Progress  Date    (1)  Patient will obtain 5/5 L knee ext MMT to indicate improved ability to walk, > 60 minutes  Progressing, Not Met  3/7/2025   (2)   Patient will obtain 5/5 L knee flex MMT to indicate improved ability to negotiate flight of stairs, alternating gait pattern, no HRA   Progressing, Not Met 3/7/2025    (3)   Patient will return to previous level of function for  participation in coaching tasks  Progressing, Not Met  3/7/2025       Plan     Plan of care Certification: 3/7/2025 to 4/18/2025.    Outpatient Physical Therapy including the following interventions: Cervical/Lumbar Traction, Electrical Stimulation re-eval, dry needling, Gait Training, Iontophoresis (with ), Manual Therapy, Moist Heat/ Ice, Neuromuscular Re-ed, Patient Education, Self Care, Therapeutic Activities, and Therapeutic Exercise.     Physical therapist and physical therapy assistant(s) will met face to face to discuss patient's treatment plan and progress towards established goals. Pt will be seen by a physical therapist minimally every 6th visit or every 30 days.    Stacie Diaz, PT, DPT

## 2025-03-11 ENCOUNTER — CLINICAL SUPPORT (OUTPATIENT)
Dept: REHABILITATION | Facility: HOSPITAL | Age: 35
End: 2025-03-11
Payer: COMMERCIAL

## 2025-03-11 DIAGNOSIS — R26.2 DIFFICULTY WALKING: ICD-10-CM

## 2025-03-11 DIAGNOSIS — S83.512D SPRAIN OF ANTERIOR CRUCIATE LIGAMENT OF LEFT KNEE, SUBSEQUENT ENCOUNTER: Primary | ICD-10-CM

## 2025-03-11 PROCEDURE — 97112 NEUROMUSCULAR REEDUCATION: CPT | Mod: PN,CQ

## 2025-03-11 PROCEDURE — 97530 THERAPEUTIC ACTIVITIES: CPT | Mod: PN,CQ

## 2025-03-11 NOTE — PROGRESS NOTES
[facility]   Physical Therapy Treatment Note     Name: Freddy Villegas  [facility] Number: [id number]    Therapy Diagnosis:   Encounter Diagnoses   Name Primary?    Sprain of anterior cruciate ligament of left knee, subsequent encounter Yes    Difficulty walking        Physician: Gaetano Polk MD    Physician Orders: PT Eval and Treat   Medical Diagnosis from Referral: S83.512A (ICD-10-CM) - Rupture of anterior cruciate ligament of left knee, initial encounter Z98.890 (ICD-10-CM) - S/P ACL reconstruction  Evaluation Date: 3/11/2025  Authorization Period Expiration: 12/31/2025  Plan of Care Expiration: 4/18/2025  Visit # / Visits authorized: 18/24  #Visits based on PHYSICAL THERAPY POC: 26     Foto  Date  Score      Knee  Lower Score = Greater Disability   #1/3 1/3/2025 31.3   #2/3 1/17/2025 52.5   #3/3 2/18/2025 63.8, closed      Time in  2:03pm   Time out 3:08 pm   Total Appointment Time 65 minutes        Precautions: Standard    DOS: 12/31/2024    Subjective     HOME EXERCISE PROGRAM  Reviewed, reports compliance    Response  Knee feeling better with less tightness. Has felt much less tightness to the knee with bending. Stairs getting easier but down is still challenging.   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, coaches football, teaches at school     Pain:      Current 5/10 at end ranges     Location L knee     Objective    3/11/2025:  Gait: WBAT, lacks L hip flexion during gait    Range of Motion (Degrees):   Knee Right  Left  Left    Flexion 144 105 PROM  102 AAROM   123 A  127 P   Extension 0 0  0 (+2 hyperext passive)      Lower Extremity Strength       Knee           Right          Left    "  Extension: 5/5 3/5   Flexion: 4+/5 4-/5      Function/balance:      Right  Left    Single leg stance  - 30"  tba   Squat tba  - -      Joint Mobility:     Right  Left    PFJ  WNL Hypomobile, painful   Knee joint   WNL Hypomobile, painful      Edema:     Joint line in cm 10 cm distal to tibial tuberosity in cm 5 cm above in cm   Right 39.0 36.5 40.0   Left  41.5 37.0 40.0      Treatment   + indicates new exercise   Bold indicates completed exercise    therapeutic exercises to develop strength, endurance, ROM, flexibility, posture, and core stabilization for 00 minutes:  Prone knee flex stretch, with strap, 10"x10   L hip flex stretch, LE off edge of mat, 30"x5   Heel raises 2 up:L lower  with hands at wall, 2x10    manual therapy techniques: Joint mobilizations, Manual traction, Myofacial release, Soft tissue Mobilization, and Friction Massage for 10 minutes:  L PFJ, knee joint (extension, flexion + tibial ER), Gr III/IV JM  Manual knee flexion with overpressure to improve knee mobility, STM along anterior knee as needed     neuromuscular re-education activities to improve: Balance, Coordination, Kinesthetic, Sense, Proprioception, and Posture for 10 minutes:  Quad set in heel prop with gastroc stretch to emphasize knee hyperextension 10x10"  LAQ on edge of mat GTB 2x10, 5" hold for L quad coordination    therapeutic activities to improve functional performance for 45 minutes:  Upright bike, full revolutions, lvl 2 resistance, 8'  6" forward step down with R/L LE, unilateral HRA, 3x10  6" lateral step down, R/L LE, unilateral HRA, 3x10  +12" forward step up with R/ LE, 3x10  Shuttle 2:1 (L lower) pushes +75#, 3x 10  Shuttle press L LE #12.5, 5" hold each way, 3 x 10  Lateral monster walks, +black band above knees, x 3 laps  +Forward/retro monster walks, +black band above knees, x 3 laps     gait training to improve functional mobility and safety for 00 minutes:      Home Exercises and Patient Education " Provided  Education provided:   - yes    Home Exercises Provided: yes.  Exercises were reviewed and Freddy was able to demonstrate them prior to the end of the session.  Freddy demonstrated good  understanding of the education provided.     Assessment   Some increase of emphasis placed on terminal knee extension with greater quad activation and recruitment to maintain end range extension. Visible muscle quivering at end range LAQ, fatigue noted. Less but continued quad shaking with step downs, hip compensation remains present. Less posterior knee pain/discomfort with reciprocal knee flexion/extension. Hamstring stretch reviewed in attempt to address L posterior knee pain.     Pt presents with decreased ROM, muscle strength, flexibility, joint mobility. Increased pain, stiffness, soft tissue restriction. Impaired posture, joint mechanics, balance, gait pattern.     The patient's current task deficits include the following: limitation in ADL, self care, social/recreational tasks.     Patient prognosis: good  Rehab potential: good    Patient will benefit from skilled outpatient Physical Therapy to address the deficits stated above and in the chart below, provide patient /family education, to maximize patient's level of independence, and to address functional deficits.    Anticipated Barriers for therapy: chronicity of current injury and post operative nature of current condition     Goals:   Short Terms Goals: 6 weeks    Goal  Progress  Date    (1)  Patient will be I with HOME EXERCISE PROGRAM  Progressing, Not Met 3/11/2025     (2)  Patient will obtain +3 deg L knee ext ACTIVE RANGE OF MOTION to indicate improved ability to stand, > 60 minutes  Progressing, Partially Met  3/11/2025     (3)   Patient will obtain > 120 deg L knee flex ACTIVE RANGE OF MOTION to indicate improved sitting tolerance, > 60 minutes   Ongoing, Not Met  3/11/2025       Long Term Goals: 12 weeks    Goal  Progress  Date    (1)  Patient will  obtain 5/5 L knee ext MMT to indicate improved ability to walk, > 60 minutes  Progressing, Not Met  3/11/2025   (2)   Patient will obtain 5/5 L knee flex MMT to indicate improved ability to negotiate flight of stairs, alternating gait pattern, no HRA   Progressing, Not Met 3/11/2025    (3)   Patient will return to previous level of function for participation in coaching tasks  Progressing, Not Met  3/11/2025       Plan     Plan of care Certification: 3/11/2025 to 4/18/2025.    Outpatient Physical Therapy including the following interventions: Cervical/Lumbar Traction, Electrical Stimulation re-eval, dry needling, Gait Training, Iontophoresis (with ), Manual Therapy, Moist Heat/ Ice, Neuromuscular Re-ed, Patient Education, Self Care, Therapeutic Activities, and Therapeutic Exercise.     Physical therapist and physical therapy assistant(s) will met face to face to discuss patient's treatment plan and progress towards established goals. Pt will be seen by a physical therapist minimally every 6th visit or every 30 days.    Linda Mercer, PTA

## 2025-03-14 ENCOUNTER — CLINICAL SUPPORT (OUTPATIENT)
Dept: REHABILITATION | Facility: HOSPITAL | Age: 35
End: 2025-03-14
Attending: PHYSICAL THERAPIST
Payer: COMMERCIAL

## 2025-03-14 DIAGNOSIS — S83.512D SPRAIN OF ANTERIOR CRUCIATE LIGAMENT OF LEFT KNEE, SUBSEQUENT ENCOUNTER: Primary | ICD-10-CM

## 2025-03-14 DIAGNOSIS — R26.2 DIFFICULTY WALKING: ICD-10-CM

## 2025-03-14 PROCEDURE — 97530 THERAPEUTIC ACTIVITIES: CPT | Mod: PN | Performed by: PHYSICAL THERAPIST

## 2025-03-14 PROCEDURE — 97140 MANUAL THERAPY 1/> REGIONS: CPT | Mod: PN | Performed by: PHYSICAL THERAPIST

## 2025-03-14 NOTE — PROGRESS NOTES
[facility]   Physical Therapy Progress Note     Name: Freddy Villegas  [facility] Number: [id number]    Therapy Diagnosis:   Encounter Diagnoses   Name Primary?    Sprain of anterior cruciate ligament of left knee, subsequent encounter Yes    Difficulty walking      Physician: Gaetano Polk MD    Physician Orders: PT Eval and Treat   Medical Diagnosis from Referral: S83.512A (ICD-10-CM) - Rupture of anterior cruciate ligament of left knee, initial encounter Z98.890 (ICD-10-CM) - S/P ACL reconstruction  Evaluation Date: 3/14/2025  Authorization Period Expiration: 12/31/2025  Plan of Care Expiration: 5/14/2025  Visit # / Visits authorized: 19/24  #Visits based on PHYSICAL THERAPY POC: 26     Foto  Date  Score      Knee  Lower Score = Greater Disability   #1/3 1/3/2025 31.3   #2/3 1/17/2025 52.5   #3/3 2/18/2025 63.8, closed      Time in  1:00pm   Time out 2:09pm   Total Appointment Time 69 minutes        Precautions: Standard    DOS: 12/31/2024    Subjective     HOME EXERCISE PROGRAM  Reviewed, reports compliance    Response  Still feeling occasional pain at back of L knee but feeling less pain and improved mobility overall. Feels like his main issue right now is weakness.    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 0    Average: 37.7      Function/balance:      Right  Left    Single leg stance  - 30"  tba   Squat tba  - -      Joint Mobility:     Right  Left    PFJ  WNL Hypomobile   Knee joint   WNL Hypomobile, painful at end range       Edema:     Joint line in cm 10 cm distal to tibial tuberosity in cm 5 cm above in cm   Right 39.0 36.5 40.0   Left  41.5 37.0 40.0      Treatment   + indicates new exercise   Bold indicates completed exercise    therapeutic exercises to develop strength, endurance, ROM, flexibility, posture, and core stabilization for 00 minutes:  Prone knee flex stretch, with strap, 10"x10   L hip flex stretch, LE off edge of mat, 30"x5   R/L single limb heel raise, forefoot on #20 weight on floor, #15 each hand, 3x10 hands at wall, 2x10    manual therapy techniques: Joint mobilizations, Manual traction, Myofacial release, Soft tissue Mobilization, and Friction Massage for 10 minutes:  L PFJ, knee joint (extension, flexion + tibial ER), Gr III/IV JM  Manual knee flexion with overpressure to improve knee mobility, STM along anterior knee as needed     neuromuscular re-education activities to improve: Balance, Coordination, Kinesthetic, Sense, Proprioception, and Posture for 00 minutes:  Quad set in heel prop with gastroc stretch to emphasize knee hyperextension 10x10"  LAQ on edge of mat GTB 2x10, 5" hold for L quad coordination    therapeutic activities to improve functional performance for 59 minutes:  Upright bike, full revolutions, lvl +3 resistance, 5'  +8" forward step down with R/L LE, unilateral HRA, 3x10  +8" lateral step down, R/L LE, unilateral HRA, 3x10  +18" forward step up with R/ LE, 3x10  Shuttle 2:1 (L lower) pushes, +5" hold in L knee single limb extension, 75#, 3x 10  Shuttle press L LE #12.5, 5" hold each way, 3 x 10  Lateral monster " walks, black band above knees, x 3 laps  Forward/retro monster walks, black band above knees, x 3 laps   +Updated PHYSICAL THERAPY POC    gait training to improve functional mobility and safety for 00 minutes:      Home Exercises and Patient Education Provided  Education provided:   - yes    Home Exercises Provided: yes.  Exercises were reviewed and Freddy was able to demonstrate them prior to the end of the session.  Freddy demonstrated good  understanding of the education provided.     Assessment   Visible muscle quivering at L LE with deceleration and knee flex tasks (open and close chain), fatigue noted. Less but continued quad shaking with step downs, hip compensation improving but remains present. About 20% difference in R vs L quads strength. L knee flex ACTIVE RANGE OF MOTION has improved to 127 deg.    Pt presents with decreased ROM, muscle strength, flexibility, joint mobility. Increased pain, stiffness, soft tissue restriction. Impaired posture, joint mechanics, balance, gait pattern.     The patient's current task deficits include the following: limitation in ADL, self care, social/recreational tasks.     Patient prognosis: good  Rehab potential: good    Patient will benefit from skilled outpatient Physical Therapy to address the deficits stated above and in the chart below, provide patient /family education, to maximize patient's level of independence, and to address functional deficits.    Anticipated Barriers for therapy: chronicity of current injury and post operative nature of current condition     Goals:   Short Terms Goals: 6 weeks    Goal  Progress  Date    (1)  Patient will be I with HOME EXERCISE PROGRAM  Progressing, Not Met 3/14/2025     (2)  Patient will obtain +3 deg L knee ext ACTIVE RANGE OF MOTION to indicate improved ability to stand, > 60 minutes  Progressing, Nearly Met  3/14/2025     (3)   Patient will obtain > 120 deg L knee flex ACTIVE RANGE OF MOTION to indicate improved sitting  tolerance, > 60 minutes   Progressing, Partially Met  3/14/2025       Long Term Goals: 12 weeks    Goal  Progress  Date    (1)  Patient will obtain 5/5 L knee ext MMT to indicate improved ability to walk, > 60 minutes  Progressing, Not Met  3/14/2025   (2)   Patient will obtain 5/5 L knee flex MMT to indicate improved ability to negotiate flight of stairs, alternating gait pattern, no HRA   Progressing, Not Met 3/14/2025    (3)   Patient will return to previous level of function for participation in coaching tasks  Progressing, Not Met  3/14/2025       Plan     Plan of care Certification: 3/14/2025 to 5/14/2025    Outpatient Physical Therapy including the following interventions: Cervical/Lumbar Traction, Electrical Stimulation re-eval, dry needling, Gait Training, Iontophoresis (with ), Manual Therapy, Moist Heat/ Ice, Neuromuscular Re-ed, Patient Education, Self Care, Therapeutic Activities, and Therapeutic Exercise.     Physical therapist and physical therapy assistant(s) will met face to face to discuss patient's treatment plan and progress towards established goals. Pt will be seen by a physical therapist minimally every 6th visit or every 30 days.    Stacie Diaz, PT, DPT       I CERTIFY THE NEED FOR THESE SERVICES FURNISHED UNDER THIS PLAN OF TREATMENT AND WHILE UNDER MY CARE     Physician's comments:           Physician's Signature: ___________________________________________________

## 2025-03-18 ENCOUNTER — CLINICAL SUPPORT (OUTPATIENT)
Dept: REHABILITATION | Facility: HOSPITAL | Age: 35
End: 2025-03-18
Payer: COMMERCIAL

## 2025-03-18 DIAGNOSIS — S83.512D SPRAIN OF ANTERIOR CRUCIATE LIGAMENT OF LEFT KNEE, SUBSEQUENT ENCOUNTER: Primary | ICD-10-CM

## 2025-03-18 DIAGNOSIS — R26.2 DIFFICULTY WALKING: ICD-10-CM

## 2025-03-18 PROCEDURE — 97530 THERAPEUTIC ACTIVITIES: CPT | Mod: PN,CQ

## 2025-03-18 NOTE — PROGRESS NOTES
[facility]   Physical Therapy Progress Note     Name: Freddy Villegas  [facility] Number: [id number]    Therapy Diagnosis:   Encounter Diagnoses   Name Primary?    Sprain of anterior cruciate ligament of left knee, subsequent encounter Yes    Difficulty walking      Physician: Gaetano Polk MD    Physician Orders: PT Eval and Treat   Medical Diagnosis from Referral: S83.512A (ICD-10-CM) - Rupture of anterior cruciate ligament of left knee, initial encounter Z98.890 (ICD-10-CM) - S/P ACL reconstruction  Evaluation Date: 3/18/2025  Authorization Period Expiration: 12/31/2025  Plan of Care Expiration: 5/14/2025  Visit # / Visits authorized: 22/24  #Visits based on PHYSICAL THERAPY POC: 26     Foto  Date  Score      Knee  Lower Score = Greater Disability   #1/3 1/3/2025 31.3   #2/3 1/17/2025 52.5   #3/3 2/18/2025 63.8, closed      Time in  2:04 pm   Time out 3:04 pm   Total Appointment Time 60 minutes        Precautions: Standard    DOS: 12/31/2024    Subjective     HOME EXERCISE PROGRAM  Reviewed, reports compliance    Response  Knee doing okay today, a little stiffness still but overall not too bad.  Feels like his main issue right now is weakness.    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, coaches football, teaches at school     Pain:      Current 5/10 at end ranges     Location L knee     Objective    3/18/2025:  Gait: WBAT, lacks L hip flexion during gait    Range of Motion (Degrees):   Knee Right  Left  Left    Flexion 144 123 A  127 P 132   Extension 0 0 (+2 hyperext passive) +2          Treatment   + indicates new exercise   Bold indicates completed exercise    therapeutic exercises to develop  "strength, endurance, ROM, flexibility, posture, and core stabilization for 00 minutes:  Prone knee flex stretch, with strap, 10"x10   L hip flex stretch, LE off edge of mat, 30"x5   R/L single limb heel raise, forefoot on #20 weight on floor, #15 each hand, 3x10 hands at wall, 2x10    manual therapy techniques: Joint mobilizations, Manual traction, Myofacial release, Soft tissue Mobilization, and Friction Massage for 5 minutes:  L PFJ, knee joint (extension, flexion + tibial ER), Gr III/IV JM  Manual knee flexion with overpressure to improve knee mobility, STM along anterior knee as needed     neuromuscular re-education activities to improve: Balance, Coordination, Kinesthetic, Sense, Proprioception, and Posture for 00 minutes:  Quad set in heel prop with gastroc stretch to emphasize knee hyperextension 10x10"  LAQ on edge of mat GTB 2x10, 5" hold for L quad coordination    therapeutic activities to improve functional performance for 55 minutes:  Upright bike, full revolutions, lvl 3 resistance, 5'  Shuttle 2:1 (L lower) pushes, +5" hold in L knee single limb extension, 75#, 3x 10  +Shuttle 5" isometric holds at varying levels of knee flexion x 10 rounds   +Triple extension heel raise at wall (contralateral knee ) 3x10  Shuttle press L LE #12.5, 5" hold each way, 3 x 10  8" forward step down with R/L LE, unilateral HRA, 3x10  8" lateral step down, R/L LE, unilateral HRA, 3x10  18" forward step up with R/ LE, 3x10  Lateral monster walks, black band above knees, x 3 laps  Forward/retro monster walks, black band above knees, x 3 laps   Updated PHYSICAL THERAPY POC    May add: wall sits, multi-angle quad isometrics for quad activation/endurance, split squat, lateral lunge    gait training to improve functional mobility and safety for 00 minutes:      Home Exercises and Patient Education Provided  Education provided:   - yes    Home Exercises Provided: yes.  Exercises were reviewed and Freddy was able to " demonstrate them prior to the end of the session.  Freddy demonstrated good  understanding of the education provided.     Assessment   Patient progressing with strength though continues with strength and endurance limitations with functional activities.  Visible muscle quivering to  L LE with loaded closed chain activities through full ROM, more shaking with greater knee flexion. Quivering also present through open chain activities as well. Able to perform higher level box step ups with good mechanics, some challenge with motor control through eccentric motion. L knee flex ACTIVE RANGE OF MOTION has improved to 132 deg while supine.    Pt presents with decreased ROM, muscle strength, flexibility, joint mobility. Increased pain, stiffness, soft tissue restriction. Impaired posture, joint mechanics, balance, gait pattern.     The patient's current task deficits include the following: limitation in ADL, self care, social/recreational tasks.     Patient prognosis: good  Rehab potential: good    Patient will benefit from skilled outpatient Physical Therapy to address the deficits stated above and in the chart below, provide patient /family education, to maximize patient's level of independence, and to address functional deficits.    Anticipated Barriers for therapy: chronicity of current injury and post operative nature of current condition     Goals:   Short Terms Goals: 6 weeks    Goal  Progress  Date    (1)  Patient will be I with HOME EXERCISE PROGRAM  Progressing, Not Met 3/18/2025     (2)  Patient will obtain +3 deg L knee ext ACTIVE RANGE OF MOTION to indicate improved ability to stand, > 60 minutes  Progressing, Nearly Met  3/18/2025     (3)   Patient will obtain > 120 deg L knee flex ACTIVE RANGE OF MOTION to indicate improved sitting tolerance, > 60 minutes   Progressing, Partially Met  3/18/2025       Long Term Goals: 12 weeks    Goal  Progress  Date    (1)  Patient will obtain 5/5 L knee ext MMT to indicate  improved ability to walk, > 60 minutes  Progressing, Not Met  3/18/2025   (2)   Patient will obtain 5/5 L knee flex MMT to indicate improved ability to negotiate flight of stairs, alternating gait pattern, no HRA   Progressing, Not Met 3/18/2025    (3)   Patient will return to previous level of function for participation in coaching tasks  Progressing, Not Met  3/18/2025       Plan     Plan of care Certification: 3/18/2025 to 5/14/2025    Outpatient Physical Therapy including the following interventions: Cervical/Lumbar Traction, Electrical Stimulation re-eval, dry needling, Gait Training, Iontophoresis (with ), Manual Therapy, Moist Heat/ Ice, Neuromuscular Re-ed, Patient Education, Self Care, Therapeutic Activities, and Therapeutic Exercise.     Physical therapist and physical therapy assistant(s) will met face to face to discuss patient's treatment plan and progress towards established goals. Pt will be seen by a physical therapist minimally every 6th visit or every 30 days.    Linda Mercer, PTA

## 2025-04-02 DIAGNOSIS — Z98.890 S/P ACL RECONSTRUCTION: Primary | ICD-10-CM

## 2025-04-03 ENCOUNTER — OFFICE VISIT (OUTPATIENT)
Dept: ORTHOPEDICS | Facility: CLINIC | Age: 35
End: 2025-04-03
Payer: COMMERCIAL

## 2025-04-03 ENCOUNTER — HOSPITAL ENCOUNTER (OUTPATIENT)
Dept: RADIOLOGY | Facility: HOSPITAL | Age: 35
Discharge: HOME | End: 2025-04-03
Attending: ORTHOPAEDIC SURGERY
Payer: COMMERCIAL

## 2025-04-03 DIAGNOSIS — Z98.890 S/P ACL RECONSTRUCTION: Primary | ICD-10-CM

## 2025-04-03 DIAGNOSIS — Z98.890 S/P ACL RECONSTRUCTION: ICD-10-CM

## 2025-04-03 PROCEDURE — 73562 X-RAY EXAM OF KNEE 3: CPT | Mod: TC,PO,LT

## 2025-04-03 PROCEDURE — 99999 PR PBB SHADOW E&M-EST. PATIENT-LVL II: CPT | Mod: PBBFAC,,, | Performed by: ORTHOPAEDIC SURGERY

## 2025-04-03 NOTE — PROGRESS NOTES
34 years old 12 weeks out from ACL reconstruction with allograft doing well     Exam shows no signs infection Lachman test with soft endpoint, quad atrophy noted    X-rays show well placed tunnels     Plan: Continue with therapy, follow up in 6 months time  with x-rays of his left knee

## (undated) DEVICE — TRAY SKIN SCRUB WET PREMIUM

## (undated) DEVICE — STRIP MEDI WND CLSR 1/2X4IN

## (undated) DEVICE — SHAVER SYS 5.5 ULRAFRR

## (undated) DEVICE — STRAP OR TABLE 5IN X 72IN

## (undated) DEVICE — MAT EVAC SURGISAFE 36 X 48

## (undated) DEVICE — Device

## (undated) DEVICE — ULTRAGATOR

## (undated) DEVICE — NDL SPINAL 18GX3.5 SPINOCAN

## (undated) DEVICE — PAD CAST SPECIALIST STRL 6

## (undated) DEVICE — SUT 2-0 VICRYL / CT-1

## (undated) DEVICE — TUBING SUC UNIV W/CONN 12FT

## (undated) DEVICE — DRESSING XEROFORM NONADH 1X8IN

## (undated) DEVICE — BLADE SURG CARBON STEEL SZ11

## (undated) DEVICE — TOWEL OR DISP STRL BLUE 4/PK

## (undated) DEVICE — SPONGE COTTON TRAY 4X4IN

## (undated) DEVICE — SUT PROLENE 3-0 PS-2 BL 18IN

## (undated) DEVICE — KIT SAHARA DRAPE DRAW/LIFT

## (undated) DEVICE — HANDLE SURG LIGHT NONRIGID

## (undated) DEVICE — PROBE ARTHO ENERGY 90 DEG

## (undated) DEVICE — BLADE SURG #15 CARBON STEEL

## (undated) DEVICE — DRAPE THREE-QTR REINF 53X77IN

## (undated) DEVICE — SUT ETHILON 3/0 18IN PS-1

## (undated) DEVICE — PENCIL ROCKER SWITCH 10FT CORD

## (undated) DEVICE — COVER TABLE HVY DTY 60X90IN

## (undated) DEVICE — SOL IRR NACL .9% 3000ML

## (undated) DEVICE — STOCKINET TUBULAR 1 PLY 6X60IN

## (undated) DEVICE — NEPTUNE 4 PORT MANIFOLD

## (undated) DEVICE — BANDAGE MATRIX HK LOOP 6IN 5YD

## (undated) DEVICE — TUBE SET INFLOW/OUTFLOW

## (undated) DEVICE — TOURNIQUET SB QC DP 30X4IN

## (undated) DEVICE — COVER PROXIMA MAYO STAND

## (undated) DEVICE — BLADE SURG CARBON STEEL #10

## (undated) DEVICE — DRAPE EXTREMITY W/ABC NON-SLIP

## (undated) DEVICE — BNDG COFLEX FOAM LF2 ST 6X5YD